# Patient Record
Sex: FEMALE | Race: WHITE | NOT HISPANIC OR LATINO | Employment: UNEMPLOYED | URBAN - METROPOLITAN AREA
[De-identification: names, ages, dates, MRNs, and addresses within clinical notes are randomized per-mention and may not be internally consistent; named-entity substitution may affect disease eponyms.]

---

## 2017-09-27 ENCOUNTER — HOSPITAL ENCOUNTER (EMERGENCY)
Facility: HOSPITAL | Age: 37
Discharge: HOME | End: 2017-09-27
Attending: EMERGENCY MEDICINE
Payer: MEDICAID

## 2017-09-27 VITALS
BODY MASS INDEX: 32.39 KG/M2 | OXYGEN SATURATION: 100 % | TEMPERATURE: 98 F | SYSTOLIC BLOOD PRESSURE: 123 MMHG | DIASTOLIC BLOOD PRESSURE: 77 MMHG | RESPIRATION RATE: 20 BRPM | HEART RATE: 84 BPM | HEIGHT: 60 IN | WEIGHT: 165 LBS

## 2017-09-27 DIAGNOSIS — N20.1 LEFT URETERAL STONE: Primary | ICD-10-CM

## 2017-09-27 LAB
B-HCG UR QL: NEGATIVE
BACTERIA #/AREA URNS AUTO: ABNORMAL /HPF
BILIRUB UR QL STRIP: NEGATIVE
CLARITY UR REFRACT.AUTO: ABNORMAL
COLOR UR AUTO: ABNORMAL
GLUCOSE UR QL STRIP: ABNORMAL
HGB UR QL STRIP: ABNORMAL
HYALINE CASTS UR QL AUTO: 0 /LPF
KETONES UR QL STRIP: ABNORMAL
LEUKOCYTE ESTERASE UR QL STRIP: ABNORMAL
MICROSCOPIC COMMENT: ABNORMAL
NITRITE UR QL STRIP: NEGATIVE
PH UR STRIP: 5 [PH] (ref 5–8)
PROT UR QL STRIP: ABNORMAL
RBC #/AREA URNS AUTO: 30 /HPF (ref 0–4)
SP GR UR STRIP: 1.03 (ref 1–1.03)
URN SPEC COLLECT METH UR: ABNORMAL
UROBILINOGEN UR STRIP-ACNC: 1 EU/DL
WBC #/AREA URNS AUTO: 5 /HPF (ref 0–5)

## 2017-09-27 PROCEDURE — 99284 EMERGENCY DEPT VISIT MOD MDM: CPT | Mod: 25

## 2017-09-27 PROCEDURE — 87086 URINE CULTURE/COLONY COUNT: CPT

## 2017-09-27 PROCEDURE — 81025 URINE PREGNANCY TEST: CPT

## 2017-09-27 PROCEDURE — 96372 THER/PROPH/DIAG INJ SC/IM: CPT

## 2017-09-27 PROCEDURE — 63600175 PHARM REV CODE 636 W HCPCS: Performed by: EMERGENCY MEDICINE

## 2017-09-27 PROCEDURE — 81000 URINALYSIS NONAUTO W/SCOPE: CPT

## 2017-09-27 RX ORDER — KETOROLAC TROMETHAMINE 30 MG/ML
60 INJECTION, SOLUTION INTRAMUSCULAR; INTRAVENOUS
Status: COMPLETED | OUTPATIENT
Start: 2017-09-27 | End: 2017-09-27

## 2017-09-27 RX ORDER — HYDROCODONE BITARTRATE AND ACETAMINOPHEN 7.5; 325 MG/1; MG/1
1 TABLET ORAL EVERY 6 HOURS PRN
Qty: 20 TABLET | Refills: 0 | Status: SHIPPED | OUTPATIENT
Start: 2017-09-27 | End: 2020-05-26

## 2017-09-27 RX ORDER — TAMSULOSIN HYDROCHLORIDE 0.4 MG/1
0.4 CAPSULE ORAL DAILY
Qty: 5 CAPSULE | Refills: 1 | Status: ON HOLD | OUTPATIENT
Start: 2017-09-27 | End: 2020-06-05 | Stop reason: HOSPADM

## 2017-09-27 RX ADMIN — KETOROLAC TROMETHAMINE 60 MG: 60 INJECTION, SOLUTION INTRAMUSCULAR at 01:09

## 2017-09-27 NOTE — ED PROVIDER NOTES
"Encounter Date: 2017       History     Chief Complaint   Patient presents with    Abdominal Pain     pt states "it feels like my crotch is about to fall out" started about 2 hours ago. denies any urinary s/s. left sided abdominal pain that radiates down to pelvis. no n/v/d. last bm was earlier tonight.      The history is provided by the patient.   Abdominal Cramping   The primary symptoms of the illness include abdominal pain. The current episode started 3 to 5 hours ago. The onset of the illness was sudden.   The abdominal pain began 3 to 5 hours ago. The pain came on suddenly. The abdominal pain has been unchanged since its onset. The abdominal pain is located in the left flank and suprapubic region. The abdominal pain does not radiate. The severity of the abdominal pain is 3/10. The abdominal pain is relieved by nothing.   The patient states that she believes she is currently not pregnant. The patient has not had a change in bowel habit. Additional symptoms associated with the illness include chills. Symptoms associated with the illness do not include anorexia or diaphoresis. Significant associated medical issues do not include sickle cell disease or HIV.     Review of patient's allergies indicates:   Allergen Reactions    Pcn [penicillins] Hives    Sulfa (sulfonamide antibiotics) Other (See Comments)     Pt reports she felt really weak     Past Medical History:   Diagnosis Date    Anxiety     Cervical vertebral fusion     Depression      Past Surgical History:   Procedure Laterality Date    CARPAL TUNNEL RELEASE      left and right wrists    CERVICAL FUSION  2016     SECTION, CLASSIC      INTRAUTERINE DEVICE INSERTION       History reviewed. No pertinent family history.  Social History   Substance Use Topics    Smoking status: Current Every Day Smoker     Packs/day: 0.50     Types: Cigarettes    Smokeless tobacco: Never Used    Alcohol use No     Review of Systems "   Constitutional: Positive for chills. Negative for diaphoresis.   Gastrointestinal: Positive for abdominal pain. Negative for anorexia.       Physical Exam     Initial Vitals [09/27/17 0133]   BP Pulse Resp Temp SpO2   132/71 82 17 98.3 °F (36.8 °C) 99 %      MAP       91.33         Physical Exam    Nursing note and vitals reviewed.  Constitutional: She appears well-developed and well-nourished.   HENT:   Head: Normocephalic and atraumatic.   Eyes: EOM are normal.   Neck: Normal range of motion. Neck supple.   Pulmonary/Chest: Breath sounds normal.   Abdominal: Soft.   Musculoskeletal: Normal range of motion.   Neurological: She is alert and oriented to person, place, and time.   Skin: Skin is warm and dry. Capillary refill takes less than 2 seconds.   Psychiatric: She has a normal mood and affect. Her behavior is normal. Judgment and thought content normal.         ED Course   Procedures  Labs Reviewed   URINALYSIS - Abnormal; Notable for the following:        Result Value    Appearance, UA Hazy (*)     Protein, UA 2+ (*)     Glucose, UA Trace (*)     Ketones, UA 1+ (*)     Occult Blood UA 3+ (*)     Leukocytes, UA 2+ (*)     All other components within normal limits   URINALYSIS MICROSCOPIC - Abnormal; Notable for the following:     RBC, UA 30 (*)     Bacteria, UA Few (*)     All other components within normal limits   CULTURE, URINE   PREGNANCY TEST, URINE RAPID                               ED Course      Clinical Impression:   The encounter diagnosis was Left ureteral stone.    Disposition:   Disposition: Discharged  Condition: Stable                        Cindy Chandra MD  09/27/17 0325

## 2017-09-28 LAB — BACTERIA UR CULT: NO GROWTH

## 2020-05-26 ENCOUNTER — HOSPITAL ENCOUNTER (EMERGENCY)
Facility: HOSPITAL | Age: 40
Discharge: HOME | End: 2020-05-26
Attending: EMERGENCY MEDICINE
Payer: MEDICAID

## 2020-05-26 VITALS
BODY MASS INDEX: 27.48 KG/M2 | TEMPERATURE: 98 F | OXYGEN SATURATION: 100 % | HEIGHT: 60 IN | DIASTOLIC BLOOD PRESSURE: 85 MMHG | WEIGHT: 140 LBS | RESPIRATION RATE: 20 BRPM | SYSTOLIC BLOOD PRESSURE: 127 MMHG | HEART RATE: 101 BPM

## 2020-05-26 DIAGNOSIS — Z04.71 ENCOUNTER FOR EXAMINATION AND OBSERVATION FOLLOWING ALLEGED ADULT PHYSICAL ABUSE: ICD-10-CM

## 2020-05-26 DIAGNOSIS — S01.81XA FACIAL LACERATION, INITIAL ENCOUNTER: Primary | ICD-10-CM

## 2020-05-26 PROCEDURE — 25000003 PHARM REV CODE 250: Mod: ER | Performed by: EMERGENCY MEDICINE

## 2020-05-26 PROCEDURE — 99283 EMERGENCY DEPT VISIT LOW MDM: CPT | Mod: ER

## 2020-05-26 RX ORDER — TRAMADOL HYDROCHLORIDE AND ACETAMINOPHEN 37.5; 325 MG/1; MG/1
1 TABLET, FILM COATED ORAL EVERY 6 HOURS PRN
Qty: 9 TABLET | Refills: 0 | Status: ON HOLD | OUTPATIENT
Start: 2020-05-26 | End: 2020-06-05 | Stop reason: HOSPADM

## 2020-05-26 RX ORDER — OXYCODONE AND ACETAMINOPHEN 5; 325 MG/1; MG/1
1 TABLET ORAL
Status: COMPLETED | OUTPATIENT
Start: 2020-05-26 | End: 2020-05-26

## 2020-05-26 RX ADMIN — OXYCODONE AND ACETAMINOPHEN 1 TABLET: 5; 325 TABLET ORAL at 05:05

## 2020-05-26 NOTE — DISCHARGE INSTRUCTIONS
Please return to the ED immediately if symptoms return, change or worsen in any way.  Please call your primary doctor today for reevaluation appointment.

## 2020-05-26 NOTE — ED NOTES
Pt was unable to get in touch with her sister but was able to get in touch with her Aunt, who lives in [facility]. Her Aunt is on the way to  patient.
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
## 2020-05-29 PROBLEM — E87.6 HYPOKALEMIA: Status: ACTIVE | Noted: 2020-05-29

## 2020-05-29 PROBLEM — R45.89 SUICIDAL BEHAVIOR: Status: ACTIVE | Noted: 2020-05-29

## 2020-05-29 PROBLEM — R10.9 ABDOMINAL PAIN: Status: ACTIVE | Noted: 2020-05-29

## 2020-05-29 PROBLEM — R07.89 CHEST DISCOMFORT: Status: ACTIVE | Noted: 2020-05-29

## 2020-05-30 ENCOUNTER — ANESTHESIA EVENT (OUTPATIENT)
Dept: SURGERY | Facility: HOSPITAL | Age: 40
DRG: 327 | End: 2020-05-30
Payer: MEDICAID

## 2020-05-30 ENCOUNTER — HOSPITAL ENCOUNTER (INPATIENT)
Facility: HOSPITAL | Age: 40
LOS: 6 days | Discharge: PSYCHIATRIC OR SUBSTANCE USE FACILITY | DRG: 327 | End: 2020-06-05
Attending: SURGERY | Admitting: SURGERY
Payer: MEDICAID

## 2020-05-30 ENCOUNTER — ANESTHESIA (OUTPATIENT)
Dept: SURGERY | Facility: HOSPITAL | Age: 40
DRG: 327 | End: 2020-05-30
Payer: MEDICAID

## 2020-05-30 DIAGNOSIS — R45.89 SUICIDAL BEHAVIOR WITHOUT ATTEMPTED SELF-INJURY: Primary | ICD-10-CM

## 2020-05-30 DIAGNOSIS — R16.1 SPLENIC MASS: ICD-10-CM

## 2020-05-30 DIAGNOSIS — R10.10 PAIN OF UPPER ABDOMEN: ICD-10-CM

## 2020-05-30 DIAGNOSIS — K25.1 ACUTE GASTRIC ULCER WITH PERFORATION: ICD-10-CM

## 2020-05-30 DIAGNOSIS — K66.8 INTRA-ABDOMINAL FREE AIR OF UNKNOWN ETIOLOGY: ICD-10-CM

## 2020-05-30 PROBLEM — K25.5 GASTRIC PERFORATION: Status: ACTIVE | Noted: 2020-05-30

## 2020-05-30 LAB
ABO + RH BLD: NORMAL
BLD GP AB SCN CELLS X3 SERPL QL: NORMAL
SARS-COV-2 RDRP RESP QL NAA+PROBE: NEGATIVE

## 2020-05-30 PROCEDURE — 25000003 PHARM REV CODE 250: Performed by: STUDENT IN AN ORGANIZED HEALTH CARE EDUCATION/TRAINING PROGRAM

## 2020-05-30 PROCEDURE — 88305 TISSUE EXAM BY PATHOLOGIST: CPT | Mod: 26,,, | Performed by: PATHOLOGY

## 2020-05-30 PROCEDURE — 87075 CULTR BACTERIA EXCEPT BLOOD: CPT

## 2020-05-30 PROCEDURE — 87102 FUNGUS ISOLATION CULTURE: CPT

## 2020-05-30 PROCEDURE — 87015 SPECIMEN INFECT AGNT CONCNTJ: CPT

## 2020-05-30 PROCEDURE — 88305 TISSUE EXAM BY PATHOLOGIST: ICD-10-PCS | Mod: 26,,, | Performed by: PATHOLOGY

## 2020-05-30 PROCEDURE — 87106 FUNGI IDENTIFICATION YEAST: CPT

## 2020-05-30 PROCEDURE — 37000009 HC ANESTHESIA EA ADD 15 MINS: Performed by: SURGERY

## 2020-05-30 PROCEDURE — U0002 COVID-19 LAB TEST NON-CDC: HCPCS

## 2020-05-30 PROCEDURE — 88305 TISSUE EXAM BY PATHOLOGIST: CPT | Mod: 59 | Performed by: PATHOLOGY

## 2020-05-30 PROCEDURE — C9290 INJ, BUPIVACAINE LIPOSOME: HCPCS | Performed by: SURGERY

## 2020-05-30 PROCEDURE — 37000008 HC ANESTHESIA 1ST 15 MINUTES: Performed by: SURGERY

## 2020-05-30 PROCEDURE — 25000003 PHARM REV CODE 250: Performed by: SURGERY

## 2020-05-30 PROCEDURE — 86901 BLOOD TYPING SEROLOGIC RH(D): CPT

## 2020-05-30 PROCEDURE — 87116 MYCOBACTERIA CULTURE: CPT

## 2020-05-30 PROCEDURE — 20000000 HC ICU ROOM

## 2020-05-30 PROCEDURE — 36000709 HC OR TIME LEV III EA ADD 15 MIN: Performed by: SURGERY

## 2020-05-30 PROCEDURE — 27201423 OPTIME MED/SURG SUP & DEVICES STERILE SUPPLY: Performed by: SURGERY

## 2020-05-30 PROCEDURE — 63600175 PHARM REV CODE 636 W HCPCS: Performed by: SURGERY

## 2020-05-30 PROCEDURE — 87205 SMEAR GRAM STAIN: CPT

## 2020-05-30 PROCEDURE — 87206 SMEAR FLUORESCENT/ACID STAI: CPT

## 2020-05-30 PROCEDURE — 87076 CULTURE ANAEROBE IDENT EACH: CPT

## 2020-05-30 PROCEDURE — 36000708 HC OR TIME LEV III 1ST 15 MIN: Performed by: SURGERY

## 2020-05-30 PROCEDURE — 87070 CULTURE OTHR SPECIMN AEROBIC: CPT

## 2020-05-30 PROCEDURE — 63600175 PHARM REV CODE 636 W HCPCS: Performed by: STUDENT IN AN ORGANIZED HEALTH CARE EDUCATION/TRAINING PROGRAM

## 2020-05-30 RX ORDER — FLUCONAZOLE 2 MG/ML
200 INJECTION, SOLUTION INTRAVENOUS
Status: DISCONTINUED | OUTPATIENT
Start: 2020-05-31 | End: 2020-06-04

## 2020-05-30 RX ORDER — SODIUM CHLORIDE 9 MG/ML
INJECTION, SOLUTION INTRAVENOUS CONTINUOUS PRN
Status: DISCONTINUED | OUTPATIENT
Start: 2020-05-30 | End: 2020-05-30

## 2020-05-30 RX ORDER — OXYCODONE HYDROCHLORIDE 5 MG/1
5 TABLET ORAL
Status: CANCELLED | OUTPATIENT
Start: 2020-05-30

## 2020-05-30 RX ORDER — HYDROMORPHONE HYDROCHLORIDE 2 MG/ML
0.5 INJECTION, SOLUTION INTRAMUSCULAR; INTRAVENOUS; SUBCUTANEOUS
Status: CANCELLED | OUTPATIENT
Start: 2020-05-30

## 2020-05-30 RX ORDER — LIDOCAINE HYDROCHLORIDE 20 MG/ML
INJECTION, SOLUTION EPIDURAL; INFILTRATION; INTRACAUDAL; PERINEURAL
Status: DISCONTINUED | OUTPATIENT
Start: 2020-05-30 | End: 2020-05-30

## 2020-05-30 RX ORDER — KETOROLAC TROMETHAMINE 30 MG/ML
10 INJECTION, SOLUTION INTRAMUSCULAR; INTRAVENOUS EVERY 6 HOURS
Status: COMPLETED | OUTPATIENT
Start: 2020-05-31 | End: 2020-06-01

## 2020-05-30 RX ORDER — PREGABALIN 75 MG/1
75 CAPSULE ORAL 2 TIMES DAILY
Status: DISCONTINUED | OUTPATIENT
Start: 2020-05-30 | End: 2020-06-05 | Stop reason: HOSPADM

## 2020-05-30 RX ORDER — DIPHENHYDRAMINE HYDROCHLORIDE 50 MG/ML
12.5 INJECTION INTRAMUSCULAR; INTRAVENOUS EVERY 4 HOURS PRN
Status: DISCONTINUED | OUTPATIENT
Start: 2020-05-30 | End: 2020-06-05 | Stop reason: HOSPADM

## 2020-05-30 RX ORDER — ALBUTEROL SULFATE 2.5 MG/.5ML
2.5 SOLUTION RESPIRATORY (INHALATION) EVERY 4 HOURS PRN
Status: DISCONTINUED | OUTPATIENT
Start: 2020-05-30 | End: 2020-06-05 | Stop reason: HOSPADM

## 2020-05-30 RX ORDER — FENTANYL CITRATE 50 UG/ML
INJECTION, SOLUTION INTRAMUSCULAR; INTRAVENOUS
Status: DISCONTINUED | OUTPATIENT
Start: 2020-05-30 | End: 2020-05-30

## 2020-05-30 RX ORDER — ROCURONIUM BROMIDE 10 MG/ML
INJECTION, SOLUTION INTRAVENOUS
Status: DISCONTINUED | OUTPATIENT
Start: 2020-05-30 | End: 2020-05-30

## 2020-05-30 RX ORDER — SUCCINYLCHOLINE CHLORIDE 20 MG/ML
INJECTION INTRAMUSCULAR; INTRAVENOUS
Status: DISCONTINUED | OUTPATIENT
Start: 2020-05-30 | End: 2020-05-30

## 2020-05-30 RX ORDER — PROPOFOL 10 MG/ML
VIAL (ML) INTRAVENOUS
Status: DISCONTINUED | OUTPATIENT
Start: 2020-05-30 | End: 2020-05-30

## 2020-05-30 RX ORDER — ACETAMINOPHEN 10 MG/ML
INJECTION, SOLUTION INTRAVENOUS
Status: DISCONTINUED | OUTPATIENT
Start: 2020-05-30 | End: 2020-05-30

## 2020-05-30 RX ORDER — NALOXONE HCL 0.4 MG/ML
0.02 VIAL (ML) INJECTION
Status: DISCONTINUED | OUTPATIENT
Start: 2020-05-30 | End: 2020-06-05 | Stop reason: HOSPADM

## 2020-05-30 RX ORDER — HYDROMORPHONE HYDROCHLORIDE 1 MG/ML
0.5 INJECTION, SOLUTION INTRAMUSCULAR; INTRAVENOUS; SUBCUTANEOUS ONCE
Status: COMPLETED | OUTPATIENT
Start: 2020-05-31 | End: 2020-05-30

## 2020-05-30 RX ORDER — HYDROMORPHONE HCL IN 0.9% NACL 6 MG/30 ML
PATIENT CONTROLLED ANALGESIA SYRINGE INTRAVENOUS CONTINUOUS
Status: DISCONTINUED | OUTPATIENT
Start: 2020-05-31 | End: 2020-06-02

## 2020-05-30 RX ORDER — ESCITALOPRAM OXALATE 10 MG/1
10 TABLET ORAL DAILY
Status: DISCONTINUED | OUTPATIENT
Start: 2020-05-31 | End: 2020-06-05 | Stop reason: HOSPADM

## 2020-05-30 RX ORDER — DEXAMETHASONE SODIUM PHOSPHATE 4 MG/ML
INJECTION, SOLUTION INTRA-ARTICULAR; INTRALESIONAL; INTRAMUSCULAR; INTRAVENOUS; SOFT TISSUE
Status: DISCONTINUED | OUTPATIENT
Start: 2020-05-30 | End: 2020-05-30

## 2020-05-30 RX ORDER — DEXTROSE MONOHYDRATE, SODIUM CHLORIDE, AND POTASSIUM CHLORIDE 50; 1.49; 4.5 G/1000ML; G/1000ML; G/1000ML
INJECTION, SOLUTION INTRAVENOUS CONTINUOUS
Status: DISCONTINUED | OUTPATIENT
Start: 2020-05-31 | End: 2020-06-04

## 2020-05-30 RX ORDER — SODIUM CHLORIDE 0.9 % (FLUSH) 0.9 %
10 SYRINGE (ML) INJECTION
Status: CANCELLED | OUTPATIENT
Start: 2020-05-30

## 2020-05-30 RX ORDER — LABETALOL HYDROCHLORIDE 5 MG/ML
5 INJECTION, SOLUTION INTRAVENOUS
Status: DISCONTINUED | OUTPATIENT
Start: 2020-05-30 | End: 2020-06-05 | Stop reason: HOSPADM

## 2020-05-30 RX ORDER — BACITRACIN 50000 [IU]/1
INJECTION, POWDER, FOR SOLUTION INTRAMUSCULAR
Status: DISCONTINUED | OUTPATIENT
Start: 2020-05-30 | End: 2020-05-30 | Stop reason: HOSPADM

## 2020-05-30 RX ORDER — HYDRALAZINE HYDROCHLORIDE 20 MG/ML
5 INJECTION INTRAMUSCULAR; INTRAVENOUS
Status: DISCONTINUED | OUTPATIENT
Start: 2020-05-30 | End: 2020-06-05 | Stop reason: HOSPADM

## 2020-05-30 RX ORDER — PHENYLEPHRINE HYDROCHLORIDE 10 MG/ML
INJECTION INTRAVENOUS
Status: DISCONTINUED | OUTPATIENT
Start: 2020-05-30 | End: 2020-05-30

## 2020-05-30 RX ORDER — CIPROFLOXACIN 2 MG/ML
400 INJECTION, SOLUTION INTRAVENOUS
Status: DISCONTINUED | OUTPATIENT
Start: 2020-05-31 | End: 2020-06-04

## 2020-05-30 RX ORDER — ONDANSETRON 2 MG/ML
4 INJECTION INTRAMUSCULAR; INTRAVENOUS EVERY 6 HOURS PRN
Status: DISCONTINUED | OUTPATIENT
Start: 2020-05-30 | End: 2020-06-05 | Stop reason: HOSPADM

## 2020-05-30 RX ORDER — ONDANSETRON 2 MG/ML
INJECTION INTRAMUSCULAR; INTRAVENOUS
Status: DISCONTINUED | OUTPATIENT
Start: 2020-05-30 | End: 2020-05-30

## 2020-05-30 RX ORDER — BUPIVACAINE HYDROCHLORIDE 2.5 MG/ML
INJECTION, SOLUTION EPIDURAL; INFILTRATION; INTRACAUDAL
Status: DISCONTINUED | OUTPATIENT
Start: 2020-05-30 | End: 2020-05-30 | Stop reason: HOSPADM

## 2020-05-30 RX ORDER — ONDANSETRON 2 MG/ML
4 INJECTION INTRAMUSCULAR; INTRAVENOUS DAILY PRN
Status: CANCELLED | OUTPATIENT
Start: 2020-05-30

## 2020-05-30 RX ADMIN — ROCURONIUM BROMIDE 20 MG: 10 INJECTION, SOLUTION INTRAVENOUS at 09:05

## 2020-05-30 RX ADMIN — FENTANYL CITRATE 25 MCG: 50 INJECTION, SOLUTION INTRAMUSCULAR; INTRAVENOUS at 10:05

## 2020-05-30 RX ADMIN — ROCURONIUM BROMIDE 10 MG: 10 INJECTION, SOLUTION INTRAVENOUS at 08:05

## 2020-05-30 RX ADMIN — LIDOCAINE HYDROCHLORIDE 60 MG: 20 INJECTION, SOLUTION EPIDURAL; INFILTRATION; INTRACAUDAL; PERINEURAL at 07:05

## 2020-05-30 RX ADMIN — PHENYLEPHRINE HYDROCHLORIDE 200 MCG: 10 INJECTION INTRAVENOUS at 07:05

## 2020-05-30 RX ADMIN — SODIUM CHLORIDE: 0.9 INJECTION, SOLUTION INTRAVENOUS at 07:05

## 2020-05-30 RX ADMIN — SODIUM CHLORIDE: 0.9 INJECTION, SOLUTION INTRAVENOUS at 08:05

## 2020-05-30 RX ADMIN — ACETAMINOPHEN 1000 MG: 10 INJECTION, SOLUTION INTRAVENOUS at 08:05

## 2020-05-30 RX ADMIN — SUGAMMADEX 200 MG: 100 INJECTION, SOLUTION INTRAVENOUS at 10:05

## 2020-05-30 RX ADMIN — PHENYLEPHRINE HYDROCHLORIDE 100 MCG: 10 INJECTION INTRAVENOUS at 09:05

## 2020-05-30 RX ADMIN — FENTANYL CITRATE 100 MCG: 50 INJECTION, SOLUTION INTRAMUSCULAR; INTRAVENOUS at 07:05

## 2020-05-30 RX ADMIN — PHENYLEPHRINE HYDROCHLORIDE 20 MCG/MIN: 10 INJECTION INTRAVENOUS at 09:05

## 2020-05-30 RX ADMIN — PHENYLEPHRINE HYDROCHLORIDE 200 MCG: 10 INJECTION INTRAVENOUS at 09:05

## 2020-05-30 RX ADMIN — ONDANSETRON 4 MG: 2 INJECTION, SOLUTION INTRAMUSCULAR; INTRAVENOUS at 07:05

## 2020-05-30 RX ADMIN — Medication: at 11:05

## 2020-05-30 RX ADMIN — ROCURONIUM BROMIDE 20 MG: 10 INJECTION, SOLUTION INTRAVENOUS at 07:05

## 2020-05-30 RX ADMIN — ROCURONIUM BROMIDE 20 MG: 10 INJECTION, SOLUTION INTRAVENOUS at 08:05

## 2020-05-30 RX ADMIN — SUCCINYLCHOLINE CHLORIDE 100 MG: 20 INJECTION, SOLUTION INTRAMUSCULAR; INTRAVENOUS at 07:05

## 2020-05-30 RX ADMIN — DEXAMETHASONE SODIUM PHOSPHATE 4 MG: 4 INJECTION, SOLUTION INTRA-ARTICULAR; INTRALESIONAL; INTRAMUSCULAR; INTRAVENOUS; SOFT TISSUE at 07:05

## 2020-05-30 RX ADMIN — HYDROMORPHONE HYDROCHLORIDE 0.5 MG: 1 INJECTION, SOLUTION INTRAMUSCULAR; INTRAVENOUS; SUBCUTANEOUS at 11:05

## 2020-05-30 RX ADMIN — PROPOFOL 120 MG: 10 INJECTION, EMULSION INTRAVENOUS at 07:05

## 2020-05-30 RX ADMIN — KETOROLAC TROMETHAMINE 10 MG: 30 INJECTION, SOLUTION INTRAMUSCULAR at 11:05
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
## 2020-05-30 NOTE — ANESTHESIA PREPROCEDURE EVALUATION
2020  Zandra Eckert is a 39 y.o., female with gastric perforation, recent admission for suicidal ideation, here for diagnostic lap under GETA    Past Medical History:   Diagnosis Date    Anxiety     Cervical vertebral fusion     Depression      Past Surgical History:   Procedure Laterality Date    CARPAL TUNNEL RELEASE      left and right wrists    CERVICAL FUSION  2016     SECTION, CLASSIC      INTRAUTERINE DEVICE INSERTION       Lab Results   Component Value Date    WBC 17.60 (H) 2020    HGB 15.3 2020    HCT 45.0 2020     (H) 2020    CHOL 148 2020    TRIG 84 2020    HDL 39 (L) 2020    ALT 13 2020    AST 18 2020     (L) 2020    K 4.1 2020    CL 99 2020    CREATININE 0.82 2020    BUN 17 2020    CO2 24 2020    TSH 1.00 2010    HGBA1C 5.2 2020          Anesthesia Evaluation    I have reviewed the Patient Summary Reports.    I have reviewed the Nursing Notes.    I have reviewed the Medications.     Review of Systems  Anesthesia Hx:  No problems with previous Anesthesia Denies Hx of Anesthetic complications  Denies Family Hx of Anesthesia complications.   Denies Personal Hx of Anesthesia complications.   Social:  Non-Smoker, No Alcohol Use    Hematology/Oncology:     Oncology Normal     EENT/Dental:EENT/Dental Normal   Cardiovascular:  Cardiovascular Normal Exercise tolerance: good  ECG has been reviewed.    Pulmonary:  Pulmonary Normal    Renal/:  Renal/ Normal     Hepatic/GI:   Gastric perf   Neurological:  Neurology Normal    Endocrine:  Endocrine Normal    Psych:   Psychiatric History          Physical Exam  General:  Well nourished    Airway/Jaw/Neck:  Airway Findings: Mouth Opening: Normal Tongue: Normal  Mallampati: II     Eyes/Ears/Nose:  Eyes/Ears/Nose  Findings:    Dental:  Dental Findings: Extremely poor dentition. Many chipped missing teeth   Chest/Lungs:  Chest/Lungs Findings: Clear to auscultation     Heart/Vascular:  Heart Findings: Rate: Normal        Mental Status:  Mental Status Findings:  Alert and Oriented         Anesthesia Plan  Type of Anesthesia, risks & benefits discussed:  Anesthesia Type:  general  Patient's Preference:   Intra-op Monitoring Plan: standard ASA monitors  Intra-op Monitoring Plan Comments:   Post Op Pain Control Plan:   Post Op Pain Control Plan Comments:   Induction:   IV  Beta Blocker:  Patient is not currently on a Beta-Blocker (No further documentation required).       Informed Consent: Patient understands risks and agrees with Anesthesia plan.  Questions answered. Anesthesia consent signed with patient.  ASA Score: 2  emergent   Day of Surgery Review of History & Physical:            Ready For Surgery From Anesthesia Perspective.

## 2020-05-31 LAB
ALBUMIN SERPL BCP-MCNC: 2 G/DL (ref 3.5–5.2)
ALBUMIN SERPL BCP-MCNC: 2.1 G/DL (ref 3.5–5.2)
ALP SERPL-CCNC: 69 U/L (ref 55–135)
ALP SERPL-CCNC: 70 U/L (ref 55–135)
ALT SERPL W/O P-5'-P-CCNC: 64 U/L (ref 10–44)
ALT SERPL W/O P-5'-P-CCNC: 66 U/L (ref 10–44)
ANION GAP SERPL CALC-SCNC: 11 MMOL/L (ref 8–16)
ANION GAP SERPL CALC-SCNC: 9 MMOL/L (ref 8–16)
ANISOCYTOSIS BLD QL SMEAR: SLIGHT
ANISOCYTOSIS BLD QL SMEAR: SLIGHT
AST SERPL-CCNC: 80 U/L (ref 10–40)
AST SERPL-CCNC: 92 U/L (ref 10–40)
BASOPHILS # BLD AUTO: ABNORMAL K/UL (ref 0–0.2)
BASOPHILS # BLD AUTO: ABNORMAL K/UL (ref 0–0.2)
BASOPHILS NFR BLD: 0 % (ref 0–1.9)
BASOPHILS NFR BLD: 0 % (ref 0–1.9)
BILIRUB SERPL-MCNC: 0.6 MG/DL (ref 0.1–1)
BILIRUB SERPL-MCNC: 0.6 MG/DL (ref 0.1–1)
BUN SERPL-MCNC: 18 MG/DL (ref 6–20)
BUN SERPL-MCNC: 19 MG/DL (ref 6–20)
BURR CELLS BLD QL SMEAR: ABNORMAL
BURR CELLS BLD QL SMEAR: ABNORMAL
CA-I BLDV-SCNC: 0.99 MMOL/L (ref 1.06–1.42)
CALCIUM SERPL-MCNC: 7.3 MG/DL (ref 8.7–10.5)
CALCIUM SERPL-MCNC: 7.6 MG/DL (ref 8.7–10.5)
CHLORIDE SERPL-SCNC: 104 MMOL/L (ref 95–110)
CHLORIDE SERPL-SCNC: 104 MMOL/L (ref 95–110)
CO2 SERPL-SCNC: 16 MMOL/L (ref 23–29)
CO2 SERPL-SCNC: 18 MMOL/L (ref 23–29)
CREAT SERPL-MCNC: 0.8 MG/DL (ref 0.5–1.4)
CREAT SERPL-MCNC: 0.8 MG/DL (ref 0.5–1.4)
DIFFERENTIAL METHOD: ABNORMAL
DIFFERENTIAL METHOD: ABNORMAL
EOSINOPHIL # BLD AUTO: ABNORMAL K/UL (ref 0–0.5)
EOSINOPHIL # BLD AUTO: ABNORMAL K/UL (ref 0–0.5)
EOSINOPHIL NFR BLD: 0 % (ref 0–8)
EOSINOPHIL NFR BLD: 0 % (ref 0–8)
ERYTHROCYTE [DISTWIDTH] IN BLOOD BY AUTOMATED COUNT: 12.9 % (ref 11.5–14.5)
ERYTHROCYTE [DISTWIDTH] IN BLOOD BY AUTOMATED COUNT: 13 % (ref 11.5–14.5)
EST. GFR  (AFRICAN AMERICAN): >60 ML/MIN/1.73 M^2
EST. GFR  (AFRICAN AMERICAN): >60 ML/MIN/1.73 M^2
EST. GFR  (NON AFRICAN AMERICAN): >60 ML/MIN/1.73 M^2
EST. GFR  (NON AFRICAN AMERICAN): >60 ML/MIN/1.73 M^2
GLUCOSE SERPL-MCNC: 233 MG/DL (ref 70–110)
GLUCOSE SERPL-MCNC: 235 MG/DL (ref 70–110)
GRAM STN SPEC: NORMAL
GRAM STN SPEC: NORMAL
HCT VFR BLD AUTO: 37.4 % (ref 37–48.5)
HCT VFR BLD AUTO: 41.2 % (ref 37–48.5)
HGB BLD-MCNC: 12.4 G/DL (ref 12–16)
HGB BLD-MCNC: 13.8 G/DL (ref 12–16)
IMM GRANULOCYTES # BLD AUTO: ABNORMAL K/UL (ref 0–0.04)
IMM GRANULOCYTES # BLD AUTO: ABNORMAL K/UL (ref 0–0.04)
IMM GRANULOCYTES NFR BLD AUTO: ABNORMAL % (ref 0–0.5)
IMM GRANULOCYTES NFR BLD AUTO: ABNORMAL % (ref 0–0.5)
LYMPHOCYTES # BLD AUTO: ABNORMAL K/UL (ref 1–4.8)
LYMPHOCYTES # BLD AUTO: ABNORMAL K/UL (ref 1–4.8)
LYMPHOCYTES NFR BLD: 2 % (ref 18–48)
LYMPHOCYTES NFR BLD: 2 % (ref 18–48)
MAGNESIUM SERPL-MCNC: 1.6 MG/DL (ref 1.6–2.6)
MAGNESIUM SERPL-MCNC: 1.6 MG/DL (ref 1.6–2.6)
MCH RBC QN AUTO: 30.6 PG (ref 27–31)
MCH RBC QN AUTO: 30.9 PG (ref 27–31)
MCHC RBC AUTO-ENTMCNC: 33.2 G/DL (ref 32–36)
MCHC RBC AUTO-ENTMCNC: 33.5 G/DL (ref 32–36)
MCV RBC AUTO: 92 FL (ref 82–98)
MCV RBC AUTO: 92 FL (ref 82–98)
MONOCYTES # BLD AUTO: ABNORMAL K/UL (ref 0.3–1)
MONOCYTES # BLD AUTO: ABNORMAL K/UL (ref 0.3–1)
MONOCYTES NFR BLD: 1 % (ref 4–15)
MONOCYTES NFR BLD: 2 % (ref 4–15)
NEUTROPHILS NFR BLD: 78 % (ref 38–73)
NEUTROPHILS NFR BLD: 86 % (ref 38–73)
NEUTS BAND NFR BLD MANUAL: 10 %
NEUTS BAND NFR BLD MANUAL: 19 %
NRBC BLD-RTO: 0 /100 WBC
NRBC BLD-RTO: 0 /100 WBC
OVALOCYTES BLD QL SMEAR: ABNORMAL
OVALOCYTES BLD QL SMEAR: ABNORMAL
PHOSPHATE SERPL-MCNC: 3.2 MG/DL (ref 2.7–4.5)
PHOSPHATE SERPL-MCNC: 3.4 MG/DL (ref 2.7–4.5)
PLATELET # BLD AUTO: 422 K/UL (ref 150–350)
PLATELET # BLD AUTO: 434 K/UL (ref 150–350)
PLATELET BLD QL SMEAR: ABNORMAL
PLATELET BLD QL SMEAR: ABNORMAL
PMV BLD AUTO: 9.5 FL (ref 9.2–12.9)
PMV BLD AUTO: 9.6 FL (ref 9.2–12.9)
POCT GLUCOSE: 101 MG/DL (ref 70–110)
POCT GLUCOSE: 119 MG/DL (ref 70–110)
POCT GLUCOSE: 138 MG/DL (ref 70–110)
POCT GLUCOSE: 85 MG/DL (ref 70–110)
POIKILOCYTOSIS BLD QL SMEAR: SLIGHT
POIKILOCYTOSIS BLD QL SMEAR: SLIGHT
POTASSIUM SERPL-SCNC: 3.8 MMOL/L (ref 3.5–5.1)
POTASSIUM SERPL-SCNC: 4 MMOL/L (ref 3.5–5.1)
PROT SERPL-MCNC: 5.1 G/DL (ref 6–8.4)
PROT SERPL-MCNC: 5.3 G/DL (ref 6–8.4)
RBC # BLD AUTO: 4.05 M/UL (ref 4–5.4)
RBC # BLD AUTO: 4.46 M/UL (ref 4–5.4)
SODIUM SERPL-SCNC: 131 MMOL/L (ref 136–145)
SODIUM SERPL-SCNC: 131 MMOL/L (ref 136–145)
WBC # BLD AUTO: 13.02 K/UL (ref 3.9–12.7)
WBC # BLD AUTO: 13.99 K/UL (ref 3.9–12.7)

## 2020-05-31 PROCEDURE — 94799 UNLISTED PULMONARY SVC/PX: CPT

## 2020-05-31 PROCEDURE — 84100 ASSAY OF PHOSPHORUS: CPT | Mod: 91

## 2020-05-31 PROCEDURE — 80053 COMPREHEN METABOLIC PANEL: CPT

## 2020-05-31 PROCEDURE — 94770 HC EXHALED C02 TEST: CPT

## 2020-05-31 PROCEDURE — 25000003 PHARM REV CODE 250: Performed by: SURGERY

## 2020-05-31 PROCEDURE — 11000001 HC ACUTE MED/SURG PRIVATE ROOM

## 2020-05-31 PROCEDURE — 80053 COMPREHEN METABOLIC PANEL: CPT | Mod: 91

## 2020-05-31 PROCEDURE — S0030 INJECTION, METRONIDAZOLE: HCPCS | Performed by: HOSPITALIST

## 2020-05-31 PROCEDURE — 25000003 PHARM REV CODE 250: Performed by: HOSPITALIST

## 2020-05-31 PROCEDURE — 94761 N-INVAS EAR/PLS OXIMETRY MLT: CPT

## 2020-05-31 PROCEDURE — C9113 INJ PANTOPRAZOLE SODIUM, VIA: HCPCS | Performed by: HOSPITALIST

## 2020-05-31 PROCEDURE — 83735 ASSAY OF MAGNESIUM: CPT | Mod: 91

## 2020-05-31 PROCEDURE — 63600175 PHARM REV CODE 636 W HCPCS: Performed by: SURGERY

## 2020-05-31 PROCEDURE — 85027 COMPLETE CBC AUTOMATED: CPT | Mod: 91

## 2020-05-31 PROCEDURE — 99900035 HC TECH TIME PER 15 MIN (STAT)

## 2020-05-31 PROCEDURE — 85007 BL SMEAR W/DIFF WBC COUNT: CPT

## 2020-05-31 PROCEDURE — 63600175 PHARM REV CODE 636 W HCPCS: Performed by: HOSPITALIST

## 2020-05-31 PROCEDURE — 36415 COLL VENOUS BLD VENIPUNCTURE: CPT

## 2020-05-31 PROCEDURE — 84100 ASSAY OF PHOSPHORUS: CPT

## 2020-05-31 PROCEDURE — 83735 ASSAY OF MAGNESIUM: CPT

## 2020-05-31 PROCEDURE — 82330 ASSAY OF CALCIUM: CPT

## 2020-05-31 RX ORDER — PANTOPRAZOLE SODIUM 40 MG/10ML
40 INJECTION, POWDER, LYOPHILIZED, FOR SOLUTION INTRAVENOUS DAILY
Status: DISCONTINUED | OUTPATIENT
Start: 2020-05-31 | End: 2020-06-05

## 2020-05-31 RX ORDER — ENOXAPARIN SODIUM 100 MG/ML
40 INJECTION SUBCUTANEOUS EVERY 24 HOURS
Status: DISCONTINUED | OUTPATIENT
Start: 2020-05-31 | End: 2020-06-05 | Stop reason: HOSPADM

## 2020-05-31 RX ORDER — METRONIDAZOLE 500 MG/100ML
500 INJECTION, SOLUTION INTRAVENOUS
Status: DISCONTINUED | OUTPATIENT
Start: 2020-05-31 | End: 2020-06-04

## 2020-05-31 RX ORDER — INSULIN ASPART 100 [IU]/ML
0-5 INJECTION, SOLUTION INTRAVENOUS; SUBCUTANEOUS EVERY 6 HOURS PRN
Status: DISCONTINUED | OUTPATIENT
Start: 2020-05-31 | End: 2020-06-05 | Stop reason: HOSPADM

## 2020-05-31 RX ORDER — DEXTROSE 50 % IN WATER (D50W) INTRAVENOUS SYRINGE
12.5
Status: DISCONTINUED | OUTPATIENT
Start: 2020-05-31 | End: 2020-06-05 | Stop reason: HOSPADM

## 2020-05-31 RX ORDER — GLUCAGON 1 MG
1 KIT INJECTION
Status: DISCONTINUED | OUTPATIENT
Start: 2020-05-31 | End: 2020-06-05 | Stop reason: HOSPADM

## 2020-05-31 RX ADMIN — CIPROFLOXACIN 400 MG: 2 INJECTION, SOLUTION INTRAVENOUS at 12:05

## 2020-05-31 RX ADMIN — CALCIUM GLUCONATE 1000 MG: 98 INJECTION, SOLUTION INTRAVENOUS at 02:05

## 2020-05-31 RX ADMIN — DEXTROSE, SODIUM CHLORIDE, AND POTASSIUM CHLORIDE: 5; .45; .15 INJECTION INTRAVENOUS at 10:05

## 2020-05-31 RX ADMIN — PREGABALIN 75 MG: 75 CAPSULE ORAL at 08:05

## 2020-05-31 RX ADMIN — IRON SUCROSE 100 MG: 20 INJECTION, SOLUTION INTRAVENOUS at 09:05

## 2020-05-31 RX ADMIN — METRONIDAZOLE 500 MG: 500 INJECTION, SOLUTION INTRAVENOUS at 01:05

## 2020-05-31 RX ADMIN — METRONIDAZOLE 500 MG: 500 INJECTION, SOLUTION INTRAVENOUS at 09:05

## 2020-05-31 RX ADMIN — CIPROFLOXACIN 400 MG: 2 INJECTION, SOLUTION INTRAVENOUS at 11:05

## 2020-05-31 RX ADMIN — KETOROLAC TROMETHAMINE 10 MG: 30 INJECTION, SOLUTION INTRAMUSCULAR at 11:05

## 2020-05-31 RX ADMIN — KETOROLAC TROMETHAMINE 10 MG: 30 INJECTION, SOLUTION INTRAMUSCULAR at 06:05

## 2020-05-31 RX ADMIN — DEXTROSE, SODIUM CHLORIDE, AND POTASSIUM CHLORIDE: 5; .45; .15 INJECTION INTRAVENOUS at 09:05

## 2020-05-31 RX ADMIN — KETOROLAC TROMETHAMINE 10 MG: 30 INJECTION, SOLUTION INTRAMUSCULAR at 05:05

## 2020-05-31 RX ADMIN — DEXTROSE, SODIUM CHLORIDE, AND POTASSIUM CHLORIDE: 5; .45; .15 INJECTION INTRAVENOUS at 12:05

## 2020-05-31 RX ADMIN — KETOROLAC TROMETHAMINE 10 MG: 30 INJECTION, SOLUTION INTRAMUSCULAR at 12:05

## 2020-05-31 RX ADMIN — PREGABALIN 75 MG: 75 CAPSULE ORAL at 09:05

## 2020-05-31 RX ADMIN — Medication: at 10:05

## 2020-05-31 RX ADMIN — FLUCONAZOLE 200 MG: 2 INJECTION, SOLUTION INTRAVENOUS at 12:05

## 2020-05-31 RX ADMIN — METRONIDAZOLE 500 MG: 500 INJECTION, SOLUTION INTRAVENOUS at 06:05

## 2020-05-31 RX ADMIN — ESCITALOPRAM OXALATE 10 MG: 10 TABLET ORAL at 09:05

## 2020-05-31 RX ADMIN — PANTOPRAZOLE SODIUM 40 MG: 40 INJECTION, POWDER, LYOPHILIZED, FOR SOLUTION INTRAVENOUS at 09:05

## 2020-05-31 NOTE — NURSING TRANSFER
Nursing Transfer Note      5/31/2020     Report received from OR    Transfer From: OR To: [phone]    Transfer via bed    Transfer with pt miguel, tele monitor     Transported by OR staff     Medicines sent: None     Chart send with patient: Yes

## 2020-05-31 NOTE — PROGRESS NOTES
Surgery Progress Note  05/31/2020      Interval HPI  TERESA following surgery last night  No complaints this morning other than Abd pain, controlled on PCA  Glover in place,     AF, HR 80s, BP 130s/80s       Objective  VITAL SIGNS: 24 HR MIN & MAX LAST    Temp  Min: 97.5 °F (36.4 °C)  Max: 98.7 °F (37.1 °C)  98.7 °F (37.1 °C)        BP  Min: 99/56  Max: 149/84  (!) 149/84     Pulse  Min: 81  Max: 91  87     Resp  Min: 15  Max: 39  20    SpO2  Min: 97 %  Max: 100 %  98 %      HT: 5' (152.4 cm)  WT: 63.2 kg (139 lb 5.3 oz)  BMI: 27.2     Physical Exam:  Gen: No acute distress, lying prone   Neuro: Aox3  HEENT: NCAT, EOMI   CV: Regular rate, extremities well perfused   Resp: Normal WOB  Abd: Soft, nondistended, appropriately TTP, no guarding   MSK: No pedal edema    Results  Recent Labs   Lab 05/29/20  0722 05/30/20  0827 05/31/20  0027 05/31/20  0338   WBC 12.01 17.60* 13.02* 13.99*   HGB 12.9 15.3 13.8 12.4   HCT 39.2 45.0 41.2 37.4   * 516* 422* 434*    133* 131* 131*   CO2 28 24 18* 16*   BUN 9 17 18 19   CREATININE 0.59 0.82 0.8 0.8   CALCIUM 9.1 9.2 7.3* 7.6*   MG  --  1.8 1.6 1.6   PHOS  --   --  3.2 3.4   ALKPHOS 79 80 69 70   AMYLASE  --  87  --   --        Asessment/Plan  39 y.o. female s/p ex-lap, antrectomy and Billroth 2 reconstruction for perforated gastric ulcer on 5/30    - NPO with mIVF  - remove Glover   - Trans anastomotic NGT to LIWS   - continue PCA, Toradol, Lyrica   - cipro/flagyl, PPi  - replace lytes PRN  - PT, OT  - promote IS use and OOB today  - transfer to floor this afternoon       CARLO Conner MD  [employer] General Surgery PGY-2
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
## 2020-05-31 NOTE — ANESTHESIA POSTPROCEDURE EVALUATION
Anesthesia Post Evaluation    Patient: Zandra Eckert    Procedure(s) Performed: Procedure(s) (LRB):  LAPAROTOMY, EXPLORATORY (N/A)  SPLENECTOMY (N/A)  GASTRECTOMY    Final Anesthesia Type: general    Patient location during evaluation: ICU  Patient participation: Yes- Able to Participate  Level of consciousness: awake and alert  Post-procedure vital signs: reviewed and stable  Pain management: adequate  Airway patency: patent    PONV status at discharge: No PONV  Anesthetic complications: no      Cardiovascular status: blood pressure returned to baseline  Respiratory status: unassisted  Hydration status: euvolemic  Follow-up not needed.          Vitals Value Taken Time   /82 5/31/2020  6:31 AM   Temp 37.1 °C (98.7 °F) 5/31/2020  3:30 AM   Pulse 84 5/31/2020  6:47 AM   Resp 18 5/31/2020  6:47 AM   SpO2 98 % 5/31/2020  6:47 AM   Vitals shown include unvalidated device data.      No case tracking events are documented in the log.      Pain/Hetal Score: Pain Rating Prior to Med Admin: 6 (5/31/2020  5:49 AM)  Pain Rating Post Med Admin: 8 (slleping, easily arouses) (5/30/2020 12:30 PM)

## 2020-05-31 NOTE — PLAN OF CARE
VN note: VN cued into patient's room for introduction. Sitter at bedside. Patient transferred from ICU. Admission questions not complete. VN attempted to complete questions, however patient asleep. VN reviewed chart. VN will continue to be available to patient and intervene prn.

## 2020-05-31 NOTE — NURSING
Afebrile throughout shift. A&O x 4, follows commands. Pain well controlled with Dilaudid PCA. Surgical dressing intact, scant sanguineous drainage noted. NGT to low cont suction. UO adequate. Pt on RA with sats >95%. NSR. PCT @ bedside. Protective environment maintained. Safety precautions in place. Will cont to monitor and report off to oncoming nurse to assume care.

## 2020-05-31 NOTE — TRANSFER OF CARE
Anesthesia Transfer of Care Note    Patient: Zandra Eckert    Procedure(s) Performed: Procedure(s) (LRB):  LAPAROTOMY, EXPLORATORY (N/A)  SPLENECTOMY (N/A)  GASTRECTOMY    Patient location: ICU    Anesthesia Type: general    Transport from OR: Transported from OR on 6-10 L/min O2 by face mask with adequate spontaneous ventilation    Post pain: adequate analgesia    Post assessment: no apparent anesthetic complications    Post vital signs: stable    Level of consciousness: awake and alert    Nausea/Vomiting: no nausea/vomiting    Complications: none    Transfer of care protocol was followed      Last vitals: There were no vitals taken for this visit.
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
## 2020-05-31 NOTE — NURSING
Patient transferring from room 254 ICU to room K427 via bed upon transport arrival. Ongoing cardiac and face to face monitoring. Report given to oncoming Nurse with opportunity for questions.    Assessment findings:    Patient in bed resting quietly with eyes closed. RR even and unlabored. Drowsy, but arouseable with verbal cues. Able to follow commands. Answers questions appropriately. Follows commands. NAD.  Remains without complaints of pain or discomfort. VS reported, within patients normal  limits.  REJI. Voided additional 475 ml of dark, yellow colored urine to bedpan without difficulty. RR even and unlabored on room air with continuous CO2 monitoring. PIV x 3, current dry and intact. All flush without difficulty. Reports abdomen soft. Non tender at this time. Encouraged increased activity as per tolerance. Bilat SCDS. Dilaudid PCA, paused in support of respirations at times. Ongoing monitoring.     Report given. Questions answered. Return call extension for questions if needed. RN denies further questions at this time.     Will transfer patient with 1:1 sitter and transporter.     Chrissie Peña RN  [phone] ([facility])
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
## 2020-06-01 ENCOUNTER — CLINICAL SUPPORT (OUTPATIENT)
Dept: SMOKING CESSATION | Facility: CLINIC | Age: 40
End: 2020-06-01

## 2020-06-01 DIAGNOSIS — F17.210 CIGARETTE SMOKER: Primary | ICD-10-CM

## 2020-06-01 LAB
ALBUMIN SERPL BCP-MCNC: 2 G/DL (ref 3.5–5.2)
ALP SERPL-CCNC: 69 U/L (ref 55–135)
ALT SERPL W/O P-5'-P-CCNC: 41 U/L (ref 10–44)
ANION GAP SERPL CALC-SCNC: 7 MMOL/L (ref 8–16)
ANISOCYTOSIS BLD QL SMEAR: SLIGHT
AST SERPL-CCNC: 41 U/L (ref 10–40)
BASOPHILS # BLD AUTO: ABNORMAL K/UL (ref 0–0.2)
BASOPHILS NFR BLD: 0 % (ref 0–1.9)
BILIRUB SERPL-MCNC: 0.4 MG/DL (ref 0.1–1)
BUN SERPL-MCNC: 12 MG/DL (ref 6–20)
BURR CELLS BLD QL SMEAR: ABNORMAL
CALCIUM SERPL-MCNC: 8.5 MG/DL (ref 8.7–10.5)
CHLORIDE SERPL-SCNC: 109 MMOL/L (ref 95–110)
CO2 SERPL-SCNC: 23 MMOL/L (ref 23–29)
CREAT SERPL-MCNC: 0.8 MG/DL (ref 0.5–1.4)
DIFFERENTIAL METHOD: ABNORMAL
EOSINOPHIL # BLD AUTO: ABNORMAL K/UL (ref 0–0.5)
EOSINOPHIL NFR BLD: 0 % (ref 0–8)
ERYTHROCYTE [DISTWIDTH] IN BLOOD BY AUTOMATED COUNT: 13.2 % (ref 11.5–14.5)
EST. GFR  (AFRICAN AMERICAN): >60 ML/MIN/1.73 M^2
EST. GFR  (NON AFRICAN AMERICAN): >60 ML/MIN/1.73 M^2
GLUCOSE SERPL-MCNC: 88 MG/DL (ref 70–110)
HCT VFR BLD AUTO: 31.9 % (ref 37–48.5)
HGB BLD-MCNC: 10.3 G/DL (ref 12–16)
IMM GRANULOCYTES # BLD AUTO: ABNORMAL K/UL (ref 0–0.04)
IMM GRANULOCYTES NFR BLD AUTO: ABNORMAL % (ref 0–0.5)
LYMPHOCYTES # BLD AUTO: ABNORMAL K/UL (ref 1–4.8)
LYMPHOCYTES NFR BLD: 7 % (ref 18–48)
MAGNESIUM SERPL-MCNC: 1.9 MG/DL (ref 1.6–2.6)
MCH RBC QN AUTO: 30.1 PG (ref 27–31)
MCHC RBC AUTO-ENTMCNC: 32.3 G/DL (ref 32–36)
MCV RBC AUTO: 93 FL (ref 82–98)
MONOCYTES # BLD AUTO: ABNORMAL K/UL (ref 0.3–1)
MONOCYTES NFR BLD: 3 % (ref 4–15)
NEUTROPHILS NFR BLD: 86 % (ref 38–73)
NEUTS BAND NFR BLD MANUAL: 4 %
NRBC BLD-RTO: 0 /100 WBC
OVALOCYTES BLD QL SMEAR: ABNORMAL
PHOSPHATE SERPL-MCNC: 2 MG/DL (ref 2.7–4.5)
PLATELET # BLD AUTO: 384 K/UL (ref 150–350)
PLATELET BLD QL SMEAR: ABNORMAL
PMV BLD AUTO: 9.8 FL (ref 9.2–12.9)
POCT GLUCOSE: 66 MG/DL (ref 70–110)
POCT GLUCOSE: 67 MG/DL (ref 70–110)
POCT GLUCOSE: 77 MG/DL (ref 70–110)
POCT GLUCOSE: 98 MG/DL (ref 70–110)
POIKILOCYTOSIS BLD QL SMEAR: SLIGHT
POTASSIUM SERPL-SCNC: 4.1 MMOL/L (ref 3.5–5.1)
PROT SERPL-MCNC: 5.4 G/DL (ref 6–8.4)
RBC # BLD AUTO: 3.42 M/UL (ref 4–5.4)
SODIUM SERPL-SCNC: 139 MMOL/L (ref 136–145)
WBC # BLD AUTO: 15.06 K/UL (ref 3.9–12.7)

## 2020-06-01 PROCEDURE — 84100 ASSAY OF PHOSPHORUS: CPT

## 2020-06-01 PROCEDURE — 85027 COMPLETE CBC AUTOMATED: CPT

## 2020-06-01 PROCEDURE — 99999 PR PBB SHADOW E&M-EST. PATIENT-LVL II: ICD-10-PCS | Mod: PBBFAC,,,

## 2020-06-01 PROCEDURE — 94770 HC EXHALED C02 TEST: CPT

## 2020-06-01 PROCEDURE — 90838 PSYTX W PT W E/M 60 MIN: CPT | Mod: S$GLB,,,

## 2020-06-01 PROCEDURE — 99406 BEHAV CHNG SMOKING 3-10 MIN: CPT | Mod: S$GLB,,,

## 2020-06-01 PROCEDURE — 85007 BL SMEAR W/DIFF WBC COUNT: CPT

## 2020-06-01 PROCEDURE — 11000001 HC ACUTE MED/SURG PRIVATE ROOM

## 2020-06-01 PROCEDURE — 25000003 PHARM REV CODE 250: Performed by: HOSPITALIST

## 2020-06-01 PROCEDURE — 99999 PR PBB SHADOW E&M-EST. PATIENT-LVL II: CPT | Mod: PBBFAC,,,

## 2020-06-01 PROCEDURE — 63600175 PHARM REV CODE 636 W HCPCS: Performed by: HOSPITALIST

## 2020-06-01 PROCEDURE — 94799 UNLISTED PULMONARY SVC/PX: CPT

## 2020-06-01 PROCEDURE — C9113 INJ PANTOPRAZOLE SODIUM, VIA: HCPCS | Performed by: HOSPITALIST

## 2020-06-01 PROCEDURE — 80053 COMPREHEN METABOLIC PANEL: CPT

## 2020-06-01 PROCEDURE — 94761 N-INVAS EAR/PLS OXIMETRY MLT: CPT

## 2020-06-01 PROCEDURE — 36415 COLL VENOUS BLD VENIPUNCTURE: CPT

## 2020-06-01 PROCEDURE — 90838 PR PSYCHOTHERAPY W/PATIENT W/E&M, 60 MIN (ADD ON): ICD-10-PCS | Mod: S$GLB,,,

## 2020-06-01 PROCEDURE — 25000003 PHARM REV CODE 250: Performed by: STUDENT IN AN ORGANIZED HEALTH CARE EDUCATION/TRAINING PROGRAM

## 2020-06-01 PROCEDURE — 63600175 PHARM REV CODE 636 W HCPCS: Performed by: FAMILY MEDICINE

## 2020-06-01 PROCEDURE — 83735 ASSAY OF MAGNESIUM: CPT

## 2020-06-01 PROCEDURE — 99900035 HC TECH TIME PER 15 MIN (STAT)

## 2020-06-01 PROCEDURE — 63600175 PHARM REV CODE 636 W HCPCS: Performed by: SURGERY

## 2020-06-01 PROCEDURE — 25000003 PHARM REV CODE 250: Performed by: SURGERY

## 2020-06-01 PROCEDURE — S0030 INJECTION, METRONIDAZOLE: HCPCS | Performed by: HOSPITALIST

## 2020-06-01 PROCEDURE — 99406 PT REFUSED TOBACCO CESSATION: ICD-10-PCS | Mod: S$GLB,,,

## 2020-06-01 RX ORDER — DIPHENHYDRAMINE HYDROCHLORIDE 50 MG/ML
25 INJECTION INTRAMUSCULAR; INTRAVENOUS NIGHTLY PRN
Status: DISCONTINUED | OUTPATIENT
Start: 2020-06-01 | End: 2020-06-05 | Stop reason: HOSPADM

## 2020-06-01 RX ADMIN — DEXTROSE MONOHYDRATE 12.5 G: 25 INJECTION, SOLUTION INTRAVENOUS at 06:06

## 2020-06-01 RX ADMIN — METRONIDAZOLE 500 MG: 500 INJECTION, SOLUTION INTRAVENOUS at 04:06

## 2020-06-01 RX ADMIN — PREGABALIN 75 MG: 75 CAPSULE ORAL at 08:06

## 2020-06-01 RX ADMIN — FLUCONAZOLE 200 MG: 2 INJECTION, SOLUTION INTRAVENOUS at 12:06

## 2020-06-01 RX ADMIN — IRON SUCROSE 100 MG: 20 INJECTION, SOLUTION INTRAVENOUS at 08:06

## 2020-06-01 RX ADMIN — KETOROLAC TROMETHAMINE 10 MG: 30 INJECTION, SOLUTION INTRAMUSCULAR at 05:06

## 2020-06-01 RX ADMIN — PANTOPRAZOLE SODIUM 40 MG: 40 INJECTION, POWDER, LYOPHILIZED, FOR SOLUTION INTRAVENOUS at 09:06

## 2020-06-01 RX ADMIN — METRONIDAZOLE 500 MG: 500 INJECTION, SOLUTION INTRAVENOUS at 09:06

## 2020-06-01 RX ADMIN — KETOROLAC TROMETHAMINE 10 MG: 30 INJECTION, SOLUTION INTRAMUSCULAR at 01:06

## 2020-06-01 RX ADMIN — ESCITALOPRAM OXALATE 10 MG: 10 TABLET ORAL at 09:06

## 2020-06-01 RX ADMIN — DEXTROSE, SODIUM CHLORIDE, AND POTASSIUM CHLORIDE: 5; .45; .15 INJECTION INTRAVENOUS at 04:06

## 2020-06-01 RX ADMIN — PREGABALIN 75 MG: 75 CAPSULE ORAL at 09:06

## 2020-06-01 RX ADMIN — CIPROFLOXACIN 400 MG: 2 INJECTION, SOLUTION INTRAVENOUS at 11:06

## 2020-06-01 RX ADMIN — ONDANSETRON 4 MG: 2 INJECTION INTRAMUSCULAR; INTRAVENOUS at 01:06

## 2020-06-01 RX ADMIN — METRONIDAZOLE 500 MG: 500 INJECTION, SOLUTION INTRAVENOUS at 02:06

## 2020-06-01 RX ADMIN — SODIUM PHOSPHATE, MONOBASIC, MONOHYDRATE 30 MMOL: 276; 142 INJECTION, SOLUTION INTRAVENOUS at 01:06

## 2020-06-01 RX ADMIN — Medication: at 10:06

## 2020-06-01 RX ADMIN — CIPROFLOXACIN 400 MG: 2 INJECTION, SOLUTION INTRAVENOUS at 12:06

## 2020-06-01 RX ADMIN — KETOROLAC TROMETHAMINE 10 MG: 30 INJECTION, SOLUTION INTRAMUSCULAR at 06:06

## 2020-06-01 RX ADMIN — ENOXAPARIN SODIUM 40 MG: 100 INJECTION SUBCUTANEOUS at 04:06

## 2020-06-01 NOTE — PROGRESS NOTES
Smoking cessation education provided.  Pt is a 1 pack per day cigarette smoker x 22 years.  21 mg nicotine patch ordered.  Pt states that she is ready to quit smoking.  Ambulatory referral so Smoking Cessation program following hospital discharge.
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
## 2020-06-01 NOTE — PLAN OF CARE
Plan of care reviewed with patient, understanding verbalized. NG tube on low continuous suction. PCA pump maintained. Con fluids maintained. abd dx D/I with marked dried drainage. Iv abx administered. Sitter at the bedside. Instructed to call for any assistance, understanding verbalized. Bed alarm on, call light in reach, fall precautions in place. Will continue to monitor.
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
## 2020-06-02 LAB
ALBUMIN SERPL BCP-MCNC: 2 G/DL (ref 3.5–5.2)
ALP SERPL-CCNC: 80 U/L (ref 55–135)
ALT SERPL W/O P-5'-P-CCNC: 34 U/L (ref 10–44)
ANION GAP SERPL CALC-SCNC: 9 MMOL/L (ref 8–16)
ANISOCYTOSIS BLD QL SMEAR: SLIGHT
AST SERPL-CCNC: 40 U/L (ref 10–40)
BACTERIA SPEC AEROBE CULT: ABNORMAL
BACTERIA SPEC AEROBE CULT: ABNORMAL
BASOPHILS # BLD AUTO: 0.01 K/UL (ref 0–0.2)
BASOPHILS NFR BLD: 0.1 % (ref 0–1.9)
BILIRUB SERPL-MCNC: 0.5 MG/DL (ref 0.1–1)
BUN SERPL-MCNC: 4 MG/DL (ref 6–20)
BURR CELLS BLD QL SMEAR: ABNORMAL
CALCIUM SERPL-MCNC: 8.1 MG/DL (ref 8.7–10.5)
CHLORIDE SERPL-SCNC: 104 MMOL/L (ref 95–110)
CO2 SERPL-SCNC: 23 MMOL/L (ref 23–29)
CREAT SERPL-MCNC: 0.7 MG/DL (ref 0.5–1.4)
DIFFERENTIAL METHOD: ABNORMAL
EOSINOPHIL # BLD AUTO: 0 K/UL (ref 0–0.5)
EOSINOPHIL NFR BLD: 0.3 % (ref 0–8)
ERYTHROCYTE [DISTWIDTH] IN BLOOD BY AUTOMATED COUNT: 13.3 % (ref 11.5–14.5)
EST. GFR  (AFRICAN AMERICAN): >60 ML/MIN/1.73 M^2
EST. GFR  (NON AFRICAN AMERICAN): >60 ML/MIN/1.73 M^2
GLUCOSE SERPL-MCNC: 84 MG/DL (ref 70–110)
HCT VFR BLD AUTO: 30.4 % (ref 37–48.5)
HGB BLD-MCNC: 10.4 G/DL (ref 12–16)
IMM GRANULOCYTES # BLD AUTO: 0.07 K/UL (ref 0–0.04)
IMM GRANULOCYTES NFR BLD AUTO: 0.5 % (ref 0–0.5)
LYMPHOCYTES # BLD AUTO: 0.8 K/UL (ref 1–4.8)
LYMPHOCYTES NFR BLD: 6 % (ref 18–48)
MAGNESIUM SERPL-MCNC: 1.9 MG/DL (ref 1.6–2.6)
MCH RBC QN AUTO: 31 PG (ref 27–31)
MCHC RBC AUTO-ENTMCNC: 34.2 G/DL (ref 32–36)
MCV RBC AUTO: 91 FL (ref 82–98)
MONOCYTES # BLD AUTO: 0.4 K/UL (ref 0.3–1)
MONOCYTES NFR BLD: 2.7 % (ref 4–15)
NEUTROPHILS # BLD AUTO: 12.6 K/UL (ref 1.8–7.7)
NEUTROPHILS NFR BLD: 90.4 % (ref 38–73)
NRBC BLD-RTO: 0 /100 WBC
OVALOCYTES BLD QL SMEAR: ABNORMAL
PHOSPHATE SERPL-MCNC: 3.4 MG/DL (ref 2.7–4.5)
PLATELET # BLD AUTO: 335 K/UL (ref 150–350)
PLATELET BLD QL SMEAR: ABNORMAL
PMV BLD AUTO: 10.1 FL (ref 9.2–12.9)
POCT GLUCOSE: 101 MG/DL (ref 70–110)
POCT GLUCOSE: 102 MG/DL (ref 70–110)
POCT GLUCOSE: 103 MG/DL (ref 70–110)
POIKILOCYTOSIS BLD QL SMEAR: SLIGHT
POTASSIUM SERPL-SCNC: 3.4 MMOL/L (ref 3.5–5.1)
PROT SERPL-MCNC: 5.6 G/DL (ref 6–8.4)
RBC # BLD AUTO: 3.36 M/UL (ref 4–5.4)
SODIUM SERPL-SCNC: 136 MMOL/L (ref 136–145)
WBC # BLD AUTO: 13.87 K/UL (ref 3.9–12.7)

## 2020-06-02 PROCEDURE — 94799 UNLISTED PULMONARY SVC/PX: CPT

## 2020-06-02 PROCEDURE — 94761 N-INVAS EAR/PLS OXIMETRY MLT: CPT

## 2020-06-02 PROCEDURE — 63600175 PHARM REV CODE 636 W HCPCS: Performed by: STUDENT IN AN ORGANIZED HEALTH CARE EDUCATION/TRAINING PROGRAM

## 2020-06-02 PROCEDURE — 36415 COLL VENOUS BLD VENIPUNCTURE: CPT

## 2020-06-02 PROCEDURE — 80053 COMPREHEN METABOLIC PANEL: CPT

## 2020-06-02 PROCEDURE — 63600175 PHARM REV CODE 636 W HCPCS: Performed by: SURGERY

## 2020-06-02 PROCEDURE — S0030 INJECTION, METRONIDAZOLE: HCPCS | Performed by: HOSPITALIST

## 2020-06-02 PROCEDURE — 84100 ASSAY OF PHOSPHORUS: CPT

## 2020-06-02 PROCEDURE — 63600175 PHARM REV CODE 636 W HCPCS: Performed by: FAMILY MEDICINE

## 2020-06-02 PROCEDURE — 25000003 PHARM REV CODE 250: Performed by: STUDENT IN AN ORGANIZED HEALTH CARE EDUCATION/TRAINING PROGRAM

## 2020-06-02 PROCEDURE — C9113 INJ PANTOPRAZOLE SODIUM, VIA: HCPCS | Performed by: HOSPITALIST

## 2020-06-02 PROCEDURE — 94770 HC EXHALED C02 TEST: CPT

## 2020-06-02 PROCEDURE — 11000001 HC ACUTE MED/SURG PRIVATE ROOM

## 2020-06-02 PROCEDURE — 99900035 HC TECH TIME PER 15 MIN (STAT)

## 2020-06-02 PROCEDURE — 85025 COMPLETE CBC W/AUTO DIFF WBC: CPT

## 2020-06-02 PROCEDURE — 25000003 PHARM REV CODE 250: Performed by: SURGERY

## 2020-06-02 PROCEDURE — 83735 ASSAY OF MAGNESIUM: CPT

## 2020-06-02 PROCEDURE — 63600175 PHARM REV CODE 636 W HCPCS: Performed by: HOSPITALIST

## 2020-06-02 PROCEDURE — 25000003 PHARM REV CODE 250: Performed by: HOSPITALIST

## 2020-06-02 RX ORDER — ACETAMINOPHEN 500 MG
1000 TABLET ORAL EVERY 8 HOURS
Status: DISCONTINUED | OUTPATIENT
Start: 2020-06-02 | End: 2020-06-05 | Stop reason: HOSPADM

## 2020-06-02 RX ORDER — POTASSIUM CHLORIDE 7.45 MG/ML
10 INJECTION INTRAVENOUS
Status: COMPLETED | OUTPATIENT
Start: 2020-06-02 | End: 2020-06-02

## 2020-06-02 RX ORDER — OXYCODONE HYDROCHLORIDE 5 MG/1
10 TABLET ORAL EVERY 6 HOURS PRN
Status: DISCONTINUED | OUTPATIENT
Start: 2020-06-02 | End: 2020-06-05 | Stop reason: HOSPADM

## 2020-06-02 RX ORDER — OXYCODONE HYDROCHLORIDE 5 MG/1
5 TABLET ORAL EVERY 6 HOURS PRN
Status: DISCONTINUED | OUTPATIENT
Start: 2020-06-02 | End: 2020-06-05 | Stop reason: HOSPADM

## 2020-06-02 RX ORDER — METHOCARBAMOL 100 MG/ML
500 INJECTION, SOLUTION INTRAMUSCULAR; INTRAVENOUS EVERY 8 HOURS
Status: DISCONTINUED | OUTPATIENT
Start: 2020-06-02 | End: 2020-06-05 | Stop reason: HOSPADM

## 2020-06-02 RX ORDER — MORPHINE SULFATE 2 MG/ML
2 INJECTION, SOLUTION INTRAMUSCULAR; INTRAVENOUS
Status: DISCONTINUED | OUTPATIENT
Start: 2020-06-02 | End: 2020-06-05 | Stop reason: HOSPADM

## 2020-06-02 RX ADMIN — CIPROFLOXACIN 400 MG: 2 INJECTION, SOLUTION INTRAVENOUS at 04:06

## 2020-06-02 RX ADMIN — POTASSIUM CHLORIDE 10 MEQ: 10 INJECTION, SOLUTION INTRAVENOUS at 04:06

## 2020-06-02 RX ADMIN — ACETAMINOPHEN 1000 MG: 500 TABLET ORAL at 09:06

## 2020-06-02 RX ADMIN — ESCITALOPRAM OXALATE 10 MG: 10 TABLET ORAL at 09:06

## 2020-06-02 RX ADMIN — METRONIDAZOLE 500 MG: 500 INJECTION, SOLUTION INTRAVENOUS at 04:06

## 2020-06-02 RX ADMIN — METRONIDAZOLE 500 MG: 500 INJECTION, SOLUTION INTRAVENOUS at 09:06

## 2020-06-02 RX ADMIN — PANTOPRAZOLE SODIUM 40 MG: 40 INJECTION, POWDER, LYOPHILIZED, FOR SOLUTION INTRAVENOUS at 09:06

## 2020-06-02 RX ADMIN — POTASSIUM CHLORIDE 10 MEQ: 10 INJECTION, SOLUTION INTRAVENOUS at 07:06

## 2020-06-02 RX ADMIN — POTASSIUM CHLORIDE 10 MEQ: 10 INJECTION, SOLUTION INTRAVENOUS at 09:06

## 2020-06-02 RX ADMIN — PREGABALIN 75 MG: 75 CAPSULE ORAL at 09:06

## 2020-06-02 RX ADMIN — POTASSIUM CHLORIDE 10 MEQ: 10 INJECTION, SOLUTION INTRAVENOUS at 08:06

## 2020-06-02 RX ADMIN — METRONIDAZOLE 500 MG: 500 INJECTION, SOLUTION INTRAVENOUS at 02:06

## 2020-06-02 RX ADMIN — Medication: at 06:06

## 2020-06-02 RX ADMIN — DEXTROSE, SODIUM CHLORIDE, AND POTASSIUM CHLORIDE: 5; .45; .15 INJECTION INTRAVENOUS at 04:06

## 2020-06-02 RX ADMIN — FLUCONAZOLE 200 MG: 2 INJECTION, SOLUTION INTRAVENOUS at 01:06

## 2020-06-02 RX ADMIN — IRON SUCROSE 100 MG: 20 INJECTION, SOLUTION INTRAVENOUS at 09:06

## 2020-06-02 RX ADMIN — OXYCODONE HYDROCHLORIDE 10 MG: 5 TABLET ORAL at 09:06

## 2020-06-02 RX ADMIN — ENOXAPARIN SODIUM 40 MG: 100 INJECTION SUBCUTANEOUS at 04:06

## 2020-06-02 RX ADMIN — METHOCARBAMOL 500 MG: 1000 INJECTION, SOLUTION INTRAMUSCULAR; INTRAVENOUS at 09:06

## 2020-06-02 NOTE — NURSING
Plan of care reviewed with patient. VoiceD understanding. No acute distress noted. Side rails x3, bed low, call bell within reach. Maintain bed alarm for patient safety. Patient will be monitored overnight.

## 2020-06-02 NOTE — PLAN OF CARE
Pt remains CEC'd for suicidal ideations expressed while at [facility].  Will discuss with primary team possible Psych Consult for eval.  This will be required if inpt psych placement needed at d/c.  Will await return call or Psych Consult from team.    Abby Lindsay, RN    [phone]

## 2020-06-02 NOTE — PLAN OF CARE
Problem: Adult Inpatient Plan of Care  Goal: Plan of Care Review    Patient is awake and orientedx4. Care plan explained to patient; she verbalized understanding. On room air, O2 saturation at 93-95%. No telemetry. Sitter at bedside. No n/v/d during shift. PCA pump in use. NG tube in place to low continuous suction. Due medications given. Accuchecks completed every 6 hours. Encouraged to turn every 2 hours as tolerated. Maintained on fall risk precaution. Bed in lowest position, bed alarm on, call light/personal items within reach and instructed to call for help when needed. Will continue to monitor.    Patient slept well overnight    Outcome: Ongoing, Progressing

## 2020-06-02 NOTE — PROGRESS NOTES
Surgery Progress Note  06/02/2020      Interval HPI  NAEON  NPO with NGT  Was able to get some sleep  Biggest complaint is throat soreness from NGT  + flatus, no bm    Objective  VITAL SIGNS: 24 HR MIN & MAX LAST    Temp  Min: 98.8 °F (37.1 °C)  Max: 99.5 °F (37.5 °C)  99 °F (37.2 °C)        BP  Min: 121/71  Max: 137/74  137/74     Pulse  Min: 79  Max: 109  109     Resp  Min: 12  Max: 20  18    SpO2  Min: 93 %  Max: 98 %  98 %      HT: 5' (152.4 cm)  WT: 64.5 kg (142 lb 3.2 oz)  BMI: 27.8     Physical Exam:  Gen: No acute distress, lying prone   Neuro: Aox3  HEENT: NCAT, EOMI   CV: Regular rate, extremities well perfused   Resp: Normal WOB  Abd: Soft, nondistended, appropriately TTP, no guarding   MSK: No pedal edema    Results  Recent Labs   Lab 05/30/20  0827 05/31/20  0027 05/31/20  0338 06/01/20  0359 06/02/20  0408   WBC 17.60* 13.02* 13.99* 15.06* 13.87*   HGB 15.3 13.8 12.4 10.3* 10.4*   HCT 45.0 41.2 37.4 31.9* 30.4*   * 422* 434* 384* 335   * 131* 131* 139 136   CO2 24 18* 16* 23 23   BUN 17 18 19 12 4*   CREATININE 0.82 0.8 0.8 0.8 0.7   CALCIUM 9.2 7.3* 7.6* 8.5* 8.1*   MG 1.8 1.6 1.6 1.9 1.9   PHOS  --  3.2 3.4 2.0* 3.4   ALKPHOS 80 69 70 69 80   AMYLASE 87  --   --   --   --        Asessment/Plan  39 y.o. female s/p ex-lap, antrectomy and Billroth 2 reconstruction for perforated gastric ulcer on 5/30    - coninue NPO with mIVF  - Trans anastomotic NGT to LIWS  -likely CLD and clamping trial tomorrow  - continue PCA, Toradol, Lyrica   - cipro/flagyl, PPI  - replace lytes PRN  - PT, OT  -encourage IS and ambulation    Pearl Eaton MD   [employer] General Surgery PGY2

## 2020-06-02 NOTE — PLAN OF CARE
Reviewed plan of care with patient. Patient verbalized understanding of care.Patient turned q2. Co2 and RR monitored. Blood glucose monitored. Patient is currently on room air. NG tube to gravity. PCA pump patient button within reach.Sitter at bed side. No SOB or signs/symptoms of distress noted. Bed locked and low, call light within reach, bed alarm on, non skid socks applied, fall risk band applied. Instructed to call if needing assistance. Will continue to monitor.

## 2020-06-02 NOTE — PLAN OF CARE
VN cued into pt's room for introduction. VN informed pt that VN would be working along side bedside nurse and PCT throughout shift. Level of present pain assessed. At present pain manageable. NGT remains intact. Patient states feeling a little bit better. Discussed with patient High fall risk protocol and interventions that have been initiated and cont be in place for safety. Patient verbalized clear understanding and cooperation using teach back method. Bed alarm presently activated and in use. Will cont to be available to patient and intervene prn.

## 2020-06-03 LAB
ALBUMIN SERPL BCP-MCNC: 1.9 G/DL (ref 3.5–5.2)
ALP SERPL-CCNC: 113 U/L (ref 55–135)
ALT SERPL W/O P-5'-P-CCNC: 28 U/L (ref 10–44)
ANION GAP SERPL CALC-SCNC: 6 MMOL/L (ref 8–16)
AST SERPL-CCNC: 24 U/L (ref 10–40)
BASOPHILS # BLD AUTO: 0.02 K/UL (ref 0–0.2)
BASOPHILS NFR BLD: 0.1 % (ref 0–1.9)
BILIRUB SERPL-MCNC: 0.5 MG/DL (ref 0.1–1)
BUN SERPL-MCNC: 4 MG/DL (ref 6–20)
CALCIUM SERPL-MCNC: 8.3 MG/DL (ref 8.7–10.5)
CHLORIDE SERPL-SCNC: 101 MMOL/L (ref 95–110)
CO2 SERPL-SCNC: 30 MMOL/L (ref 23–29)
CREAT SERPL-MCNC: 0.7 MG/DL (ref 0.5–1.4)
DIFFERENTIAL METHOD: ABNORMAL
EOSINOPHIL # BLD AUTO: 0.2 K/UL (ref 0–0.5)
EOSINOPHIL NFR BLD: 1.2 % (ref 0–8)
ERYTHROCYTE [DISTWIDTH] IN BLOOD BY AUTOMATED COUNT: 13 % (ref 11.5–14.5)
EST. GFR  (AFRICAN AMERICAN): >60 ML/MIN/1.73 M^2
EST. GFR  (NON AFRICAN AMERICAN): >60 ML/MIN/1.73 M^2
GLUCOSE SERPL-MCNC: 104 MG/DL (ref 70–110)
HCT VFR BLD AUTO: 30.1 % (ref 37–48.5)
HGB BLD-MCNC: 10.1 G/DL (ref 12–16)
IMM GRANULOCYTES # BLD AUTO: 0.13 K/UL (ref 0–0.04)
IMM GRANULOCYTES NFR BLD AUTO: 0.9 % (ref 0–0.5)
LYMPHOCYTES # BLD AUTO: 0.9 K/UL (ref 1–4.8)
LYMPHOCYTES NFR BLD: 5.9 % (ref 18–48)
MAGNESIUM SERPL-MCNC: 1.8 MG/DL (ref 1.6–2.6)
MCH RBC QN AUTO: 30.3 PG (ref 27–31)
MCHC RBC AUTO-ENTMCNC: 33.6 G/DL (ref 32–36)
MCV RBC AUTO: 90 FL (ref 82–98)
MONOCYTES # BLD AUTO: 0.8 K/UL (ref 0.3–1)
MONOCYTES NFR BLD: 5.6 % (ref 4–15)
NEUTROPHILS # BLD AUTO: 12.6 K/UL (ref 1.8–7.7)
NEUTROPHILS NFR BLD: 2 % (ref 38–73)
NRBC BLD-RTO: 0 /100 WBC
PHOSPHATE SERPL-MCNC: 3.2 MG/DL (ref 2.7–4.5)
PLATELET # BLD AUTO: ABNORMAL K/UL (ref 150–350)
PLATELET BLD QL SMEAR: ABNORMAL
PMV BLD AUTO: ABNORMAL FL (ref 9.2–12.9)
POCT GLUCOSE: 104 MG/DL (ref 70–110)
POCT GLUCOSE: 105 MG/DL (ref 70–110)
POCT GLUCOSE: 95 MG/DL (ref 70–110)
POTASSIUM SERPL-SCNC: 3.3 MMOL/L (ref 3.5–5.1)
PROT SERPL-MCNC: 5.6 G/DL (ref 6–8.4)
RBC # BLD AUTO: 3.33 M/UL (ref 4–5.4)
SODIUM SERPL-SCNC: 137 MMOL/L (ref 136–145)
WBC # BLD AUTO: 14.64 K/UL (ref 3.9–12.7)

## 2020-06-03 PROCEDURE — 63600175 PHARM REV CODE 636 W HCPCS: Performed by: FAMILY MEDICINE

## 2020-06-03 PROCEDURE — 63600175 PHARM REV CODE 636 W HCPCS: Performed by: STUDENT IN AN ORGANIZED HEALTH CARE EDUCATION/TRAINING PROGRAM

## 2020-06-03 PROCEDURE — 83735 ASSAY OF MAGNESIUM: CPT

## 2020-06-03 PROCEDURE — 94799 UNLISTED PULMONARY SVC/PX: CPT

## 2020-06-03 PROCEDURE — 25000003 PHARM REV CODE 250: Performed by: STUDENT IN AN ORGANIZED HEALTH CARE EDUCATION/TRAINING PROGRAM

## 2020-06-03 PROCEDURE — 36415 COLL VENOUS BLD VENIPUNCTURE: CPT

## 2020-06-03 PROCEDURE — 84100 ASSAY OF PHOSPHORUS: CPT

## 2020-06-03 PROCEDURE — 63600175 PHARM REV CODE 636 W HCPCS: Performed by: SURGERY

## 2020-06-03 PROCEDURE — 80053 COMPREHEN METABOLIC PANEL: CPT

## 2020-06-03 PROCEDURE — S0030 INJECTION, METRONIDAZOLE: HCPCS | Performed by: HOSPITALIST

## 2020-06-03 PROCEDURE — 63600175 PHARM REV CODE 636 W HCPCS: Performed by: HOSPITALIST

## 2020-06-03 PROCEDURE — 85025 COMPLETE CBC W/AUTO DIFF WBC: CPT

## 2020-06-03 PROCEDURE — 25000003 PHARM REV CODE 250: Performed by: HOSPITALIST

## 2020-06-03 PROCEDURE — 94761 N-INVAS EAR/PLS OXIMETRY MLT: CPT

## 2020-06-03 PROCEDURE — 11000001 HC ACUTE MED/SURG PRIVATE ROOM

## 2020-06-03 PROCEDURE — 99900035 HC TECH TIME PER 15 MIN (STAT)

## 2020-06-03 PROCEDURE — C9113 INJ PANTOPRAZOLE SODIUM, VIA: HCPCS | Performed by: HOSPITALIST

## 2020-06-03 PROCEDURE — 25000003 PHARM REV CODE 250: Performed by: SURGERY

## 2020-06-03 RX ORDER — POTASSIUM CHLORIDE 7.45 MG/ML
40 INJECTION INTRAVENOUS ONCE
Status: DISCONTINUED | OUTPATIENT
Start: 2020-06-03 | End: 2020-06-03

## 2020-06-03 RX ORDER — POTASSIUM CHLORIDE 7.45 MG/ML
10 INJECTION INTRAVENOUS
Status: COMPLETED | OUTPATIENT
Start: 2020-06-03 | End: 2020-06-03

## 2020-06-03 RX ADMIN — METRONIDAZOLE 500 MG: 500 INJECTION, SOLUTION INTRAVENOUS at 08:06

## 2020-06-03 RX ADMIN — POTASSIUM CHLORIDE 10 MEQ: 7.46 INJECTION, SOLUTION INTRAVENOUS at 02:06

## 2020-06-03 RX ADMIN — ENOXAPARIN SODIUM 40 MG: 100 INJECTION SUBCUTANEOUS at 04:06

## 2020-06-03 RX ADMIN — METRONIDAZOLE 500 MG: 500 INJECTION, SOLUTION INTRAVENOUS at 02:06

## 2020-06-03 RX ADMIN — CIPROFLOXACIN 400 MG: 2 INJECTION, SOLUTION INTRAVENOUS at 11:06

## 2020-06-03 RX ADMIN — METHOCARBAMOL 500 MG: 1000 INJECTION, SOLUTION INTRAMUSCULAR; INTRAVENOUS at 04:06

## 2020-06-03 RX ADMIN — OXYCODONE HYDROCHLORIDE 10 MG: 5 TABLET ORAL at 02:06

## 2020-06-03 RX ADMIN — PANTOPRAZOLE SODIUM 40 MG: 40 INJECTION, POWDER, LYOPHILIZED, FOR SOLUTION INTRAVENOUS at 08:06

## 2020-06-03 RX ADMIN — PREGABALIN 75 MG: 75 CAPSULE ORAL at 09:06

## 2020-06-03 RX ADMIN — PREGABALIN 75 MG: 75 CAPSULE ORAL at 08:06

## 2020-06-03 RX ADMIN — POTASSIUM CHLORIDE 10 MEQ: 7.46 INJECTION, SOLUTION INTRAVENOUS at 11:06

## 2020-06-03 RX ADMIN — OXYCODONE HYDROCHLORIDE 10 MG: 5 TABLET ORAL at 08:06

## 2020-06-03 RX ADMIN — OXYCODONE HYDROCHLORIDE 10 MG: 5 TABLET ORAL at 09:06

## 2020-06-03 RX ADMIN — POTASSIUM CHLORIDE 10 MEQ: 7.46 INJECTION, SOLUTION INTRAVENOUS at 05:06

## 2020-06-03 RX ADMIN — ACETAMINOPHEN 1000 MG: 500 TABLET ORAL at 05:06

## 2020-06-03 RX ADMIN — DEXTROSE, SODIUM CHLORIDE, AND POTASSIUM CHLORIDE: 5; .45; .15 INJECTION INTRAVENOUS at 05:06

## 2020-06-03 RX ADMIN — FLUCONAZOLE 200 MG: 2 INJECTION, SOLUTION INTRAVENOUS at 02:06

## 2020-06-03 RX ADMIN — MORPHINE SULFATE 2 MG: 2 INJECTION, SOLUTION INTRAMUSCULAR; INTRAVENOUS at 02:06

## 2020-06-03 RX ADMIN — ACETAMINOPHEN 1000 MG: 500 TABLET ORAL at 02:06

## 2020-06-03 RX ADMIN — POTASSIUM CHLORIDE 10 MEQ: 7.46 INJECTION, SOLUTION INTRAVENOUS at 04:06

## 2020-06-03 RX ADMIN — CIPROFLOXACIN 400 MG: 2 INJECTION, SOLUTION INTRAVENOUS at 12:06

## 2020-06-03 RX ADMIN — METRONIDAZOLE 500 MG: 500 INJECTION, SOLUTION INTRAVENOUS at 05:06

## 2020-06-03 RX ADMIN — DIPHENHYDRAMINE HYDROCHLORIDE 25 MG: 50 INJECTION, SOLUTION INTRAMUSCULAR; INTRAVENOUS at 09:06

## 2020-06-03 RX ADMIN — METHOCARBAMOL 500 MG: 1000 INJECTION, SOLUTION INTRAMUSCULAR; INTRAVENOUS at 05:06

## 2020-06-03 RX ADMIN — IRON SUCROSE 100 MG: 20 INJECTION, SOLUTION INTRAVENOUS at 08:06

## 2020-06-03 RX ADMIN — ESCITALOPRAM OXALATE 10 MG: 10 TABLET ORAL at 08:06

## 2020-06-03 RX ADMIN — METHOCARBAMOL 500 MG: 1000 INJECTION, SOLUTION INTRAMUSCULAR; INTRAVENOUS at 09:06

## 2020-06-03 NOTE — PLAN OF CARE
Patient's VS are stable, all three pivs are intact, NG tube is still intact and hooked up to low continuous suction. Patient's PCA pump was D/C around 1800 on 6/2. Patient was given oxycodone 2x prn for complaints of pain. Midline abd dressing is dry clean and intact. Patient did have an episode of pink/red urine @ 0159, general surgery was contacted and was told to monitor. Morning urine output @ 0331 was yellow. Currently monitoring

## 2020-06-03 NOTE — PHYSICIAN QUERY
"PT Name: Zandra Eckert  MR #: [id number]     ACUITY OF CONDITION CLARIFICATION    Skyla Flower RN, CCDS; [email or web address]    This form is a permanent document in the medical record.     Query Date: Poornima 3, 2020    By submitting this query, we are merely seeking further clarification of documentation to reflect the severity of illness of your patient. Please utilize your independent clinical judgment when addressing the question(s) below.    The Medical record reflects the following:     Indicators   Supporting Clinical Findings Location in Medical Record   x Documentation of condition perforated Large posterior gastric ulcer exactly at pylorus Op Note 5/30    Lab Value(s)     x Radiology Findings Impression     There is oral contrast free within the peritoneal cavity as well as free intraperitoneal air.  This is consistent with perforation of hollow viscus.  The posterior wall of the stomach appears irregular highly concerning for perforation at this location.  Recommend surgical consultation.  Moderate ascites within the lower abdomen and pelvis.    CT Scan 5/30   x Treatment/Medication Procedure:   exp lap  splenectomy  distal gastrectomy w/uncut Errol Y Gastrojejunostomy  splenix flexure mobilization    post op dx:   perforated Large posterior  exactly at pylorus  Large splenic mass  Whole abdomen with enteric content    ebl: 300 ml Op Note 5/30    Other        Provider, please specify the acuity/chronicity of "gastric ulcer"     [ xxx  ] Acute   [   ] Chronic   [   ] Acute on chronic   [   ] Other - specify: ______________   [   ]    Clinically Undetermined     Please document in your progress notes daily for the duration of treatment until resolved, and include in your discharge summary.  "
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
## 2020-06-03 NOTE — PROGRESS NOTES
Rx message.The order for 40 meq IV PB is changed to 10 meq/100 ml Q 1 hour IV PB Q 4 bags for total of 40 meq

## 2020-06-03 NOTE — PLAN OF CARE
Pt remains with NGT clamped at this time, now on PO pain meds.   Pt asleep in room, sitter at bedside.  Dr Parra with Psychiatry is aware of consult and will be seeing pt later today.  Will continue to follow for d/c assistance.  Pt has been provided resources for women's shelter and been seen by SW.  Pt states she will be going home with family in [place] at time of d/c.      Will await Psych recs at time d/c.    Abby Lindsay RN    [phone]

## 2020-06-03 NOTE — NURSING
Plan of care reviewed with patient. Voiced understanding. NG tube clamped off by MD. No acute distress noted at this time. Side rails x3, bed low, call bell within reach. Maintain bed alarm for patient safety. Patient will be monitored overnight.

## 2020-06-03 NOTE — CONSULTS
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  Rule out bipolar disorder  I am going to talk to cousin about disposition medications and current stressors.  Will continue Lexapro for her depression and anxiety.  Will consider the option of sending the patient to psych facility after speaking with cousin

## 2020-06-04 LAB
ALBUMIN SERPL BCP-MCNC: 1.8 G/DL (ref 3.5–5.2)
ALP SERPL-CCNC: 80 U/L (ref 55–135)
ALT SERPL W/O P-5'-P-CCNC: 20 U/L (ref 10–44)
ANION GAP SERPL CALC-SCNC: 4 MMOL/L (ref 8–16)
AST SERPL-CCNC: 14 U/L (ref 10–40)
BACTERIA SPEC ANAEROBE CULT: NORMAL
BASOPHILS # BLD AUTO: 0.03 K/UL (ref 0–0.2)
BASOPHILS NFR BLD: 0.2 % (ref 0–1.9)
BILIRUB SERPL-MCNC: 0.3 MG/DL (ref 0.1–1)
BUN SERPL-MCNC: 4 MG/DL (ref 6–20)
CALCIUM SERPL-MCNC: 7.9 MG/DL (ref 8.7–10.5)
CHLORIDE SERPL-SCNC: 100 MMOL/L (ref 95–110)
CO2 SERPL-SCNC: 28 MMOL/L (ref 23–29)
CREAT SERPL-MCNC: 0.8 MG/DL (ref 0.5–1.4)
DIFFERENTIAL METHOD: ABNORMAL
EOSINOPHIL # BLD AUTO: 0.3 K/UL (ref 0–0.5)
EOSINOPHIL NFR BLD: 2 % (ref 0–8)
ERYTHROCYTE [DISTWIDTH] IN BLOOD BY AUTOMATED COUNT: 13.3 % (ref 11.5–14.5)
EST. GFR  (AFRICAN AMERICAN): >60 ML/MIN/1.73 M^2
EST. GFR  (NON AFRICAN AMERICAN): >60 ML/MIN/1.73 M^2
GLUCOSE SERPL-MCNC: 394 MG/DL (ref 70–110)
HCT VFR BLD AUTO: 26.4 % (ref 37–48.5)
HGB BLD-MCNC: 8.8 G/DL (ref 12–16)
IMM GRANULOCYTES # BLD AUTO: 0.23 K/UL (ref 0–0.04)
IMM GRANULOCYTES NFR BLD AUTO: 1.5 % (ref 0–0.5)
LYMPHOCYTES # BLD AUTO: 1.4 K/UL (ref 1–4.8)
LYMPHOCYTES NFR BLD: 9 % (ref 18–48)
MAGNESIUM SERPL-MCNC: 1.6 MG/DL (ref 1.6–2.6)
MCH RBC QN AUTO: 31.1 PG (ref 27–31)
MCHC RBC AUTO-ENTMCNC: 33.3 G/DL (ref 32–36)
MCV RBC AUTO: 93 FL (ref 82–98)
MONOCYTES # BLD AUTO: 1.3 K/UL (ref 0.3–1)
MONOCYTES NFR BLD: 8.3 % (ref 4–15)
NEUTROPHILS # BLD AUTO: 12.3 K/UL (ref 1.8–7.7)
NEUTROPHILS NFR BLD: 79 % (ref 38–73)
NRBC BLD-RTO: 0 /100 WBC
PHOSPHATE SERPL-MCNC: 2.8 MG/DL (ref 2.7–4.5)
PLATELET # BLD AUTO: 416 K/UL (ref 150–350)
PMV BLD AUTO: 9.1 FL (ref 9.2–12.9)
POCT GLUCOSE: 107 MG/DL (ref 70–110)
POCT GLUCOSE: 86 MG/DL (ref 70–110)
POCT GLUCOSE: 88 MG/DL (ref 70–110)
POTASSIUM SERPL-SCNC: 5.5 MMOL/L (ref 3.5–5.1)
PROT SERPL-MCNC: 5.4 G/DL (ref 6–8.4)
RBC # BLD AUTO: 2.83 M/UL (ref 4–5.4)
SODIUM SERPL-SCNC: 132 MMOL/L (ref 136–145)
WBC # BLD AUTO: 15.58 K/UL (ref 3.9–12.7)

## 2020-06-04 PROCEDURE — 63600175 PHARM REV CODE 636 W HCPCS: Performed by: HOSPITALIST

## 2020-06-04 PROCEDURE — 63600175 PHARM REV CODE 636 W HCPCS: Performed by: FAMILY MEDICINE

## 2020-06-04 PROCEDURE — 25000003 PHARM REV CODE 250: Performed by: STUDENT IN AN ORGANIZED HEALTH CARE EDUCATION/TRAINING PROGRAM

## 2020-06-04 PROCEDURE — 83735 ASSAY OF MAGNESIUM: CPT

## 2020-06-04 PROCEDURE — 80053 COMPREHEN METABOLIC PANEL: CPT

## 2020-06-04 PROCEDURE — 25000003 PHARM REV CODE 250: Performed by: SURGERY

## 2020-06-04 PROCEDURE — 63600175 PHARM REV CODE 636 W HCPCS: Performed by: SURGERY

## 2020-06-04 PROCEDURE — 97161 PT EVAL LOW COMPLEX 20 MIN: CPT

## 2020-06-04 PROCEDURE — C9113 INJ PANTOPRAZOLE SODIUM, VIA: HCPCS | Performed by: HOSPITALIST

## 2020-06-04 PROCEDURE — 94761 N-INVAS EAR/PLS OXIMETRY MLT: CPT

## 2020-06-04 PROCEDURE — 63600175 PHARM REV CODE 636 W HCPCS: Performed by: STUDENT IN AN ORGANIZED HEALTH CARE EDUCATION/TRAINING PROGRAM

## 2020-06-04 PROCEDURE — 84100 ASSAY OF PHOSPHORUS: CPT

## 2020-06-04 PROCEDURE — S0030 INJECTION, METRONIDAZOLE: HCPCS | Performed by: HOSPITALIST

## 2020-06-04 PROCEDURE — 11000001 HC ACUTE MED/SURG PRIVATE ROOM

## 2020-06-04 PROCEDURE — 36415 COLL VENOUS BLD VENIPUNCTURE: CPT

## 2020-06-04 PROCEDURE — 99900035 HC TECH TIME PER 15 MIN (STAT)

## 2020-06-04 PROCEDURE — 85025 COMPLETE CBC W/AUTO DIFF WBC: CPT

## 2020-06-04 PROCEDURE — 25000003 PHARM REV CODE 250: Performed by: HOSPITALIST

## 2020-06-04 PROCEDURE — 94799 UNLISTED PULMONARY SVC/PX: CPT

## 2020-06-04 RX ORDER — POLYETHYLENE GLYCOL 3350 17 G/17G
17 POWDER, FOR SOLUTION ORAL 2 TIMES DAILY
Status: COMPLETED | OUTPATIENT
Start: 2020-06-04 | End: 2020-06-05

## 2020-06-04 RX ORDER — AMOXICILLIN 250 MG
1 CAPSULE ORAL 2 TIMES DAILY
Status: DISCONTINUED | OUTPATIENT
Start: 2020-06-04 | End: 2020-06-05 | Stop reason: HOSPADM

## 2020-06-04 RX ADMIN — METHOCARBAMOL 500 MG: 1000 INJECTION, SOLUTION INTRAMUSCULAR; INTRAVENOUS at 09:06

## 2020-06-04 RX ADMIN — DEXTROSE, SODIUM CHLORIDE, AND POTASSIUM CHLORIDE: 5; .45; .15 INJECTION INTRAVENOUS at 02:06

## 2020-06-04 RX ADMIN — PREGABALIN 75 MG: 75 CAPSULE ORAL at 09:06

## 2020-06-04 RX ADMIN — FLUCONAZOLE 200 MG: 2 INJECTION, SOLUTION INTRAVENOUS at 12:06

## 2020-06-04 RX ADMIN — METHOCARBAMOL 500 MG: 1000 INJECTION, SOLUTION INTRAMUSCULAR; INTRAVENOUS at 02:06

## 2020-06-04 RX ADMIN — PANTOPRAZOLE SODIUM 40 MG: 40 INJECTION, POWDER, LYOPHILIZED, FOR SOLUTION INTRAVENOUS at 09:06

## 2020-06-04 RX ADMIN — ACETAMINOPHEN 1000 MG: 500 TABLET ORAL at 05:06

## 2020-06-04 RX ADMIN — ESCITALOPRAM OXALATE 10 MG: 10 TABLET ORAL at 09:06

## 2020-06-04 RX ADMIN — ENOXAPARIN SODIUM 40 MG: 100 INJECTION SUBCUTANEOUS at 04:06

## 2020-06-04 RX ADMIN — OXYCODONE HYDROCHLORIDE 5 MG: 5 TABLET ORAL at 09:06

## 2020-06-04 RX ADMIN — METRONIDAZOLE 500 MG: 500 INJECTION, SOLUTION INTRAVENOUS at 01:06

## 2020-06-04 RX ADMIN — OXYCODONE HYDROCHLORIDE 10 MG: 5 TABLET ORAL at 09:06

## 2020-06-04 RX ADMIN — DOCUSATE SODIUM 50 MG AND SENNOSIDES 8.6 MG 1 TABLET: 8.6; 5 TABLET, FILM COATED ORAL at 09:06

## 2020-06-04 RX ADMIN — METHOCARBAMOL 500 MG: 1000 INJECTION, SOLUTION INTRAMUSCULAR; INTRAVENOUS at 05:06

## 2020-06-04 RX ADMIN — MORPHINE SULFATE 2 MG: 2 INJECTION, SOLUTION INTRAMUSCULAR; INTRAVENOUS at 03:06

## 2020-06-04 RX ADMIN — CIPROFLOXACIN 400 MG: 2 INJECTION, SOLUTION INTRAVENOUS at 01:06

## 2020-06-04 RX ADMIN — POLYETHYLENE GLYCOL 3350 17 G: 17 POWDER, FOR SOLUTION ORAL at 09:06

## 2020-06-04 RX ADMIN — OXYCODONE HYDROCHLORIDE 10 MG: 5 TABLET ORAL at 03:06

## 2020-06-04 RX ADMIN — DIPHENHYDRAMINE HYDROCHLORIDE 25 MG: 50 INJECTION, SOLUTION INTRAMUSCULAR; INTRAVENOUS at 09:06

## 2020-06-04 RX ADMIN — MICAFUNGIN SODIUM 100 MG: 20 INJECTION, POWDER, LYOPHILIZED, FOR SOLUTION INTRAVENOUS at 12:06

## 2020-06-04 RX ADMIN — METRONIDAZOLE 500 MG: 500 INJECTION, SOLUTION INTRAVENOUS at 09:06

## 2020-06-04 RX ADMIN — IRON SUCROSE 100 MG: 20 INJECTION, SOLUTION INTRAVENOUS at 09:06

## 2020-06-04 NOTE — NURSING
Spoke with Dr. Amanda MD asked about pt's condition with NG tube.  Pt has been tolerating clear liquid diet well, and hasn't had any complaint of nausea and vomiting during the day and until now.  Nurse informed that NG tube residual checked with the evening medication, upto 20cc with PH 5 noted.  Pt stated tenderness present on abdomen.  MD stated pt has no nausea/vomiting, okay to discontinue NG tube tonight, and continue to monitor.

## 2020-06-04 NOTE — PLAN OF CARE
VN rounds: VN cued into pt's room with pt's permission. Pt currently CEC. Pt resting in bed. VN instructed pt to call for assistance. Pt aware and agreeable. Allowed time for questions. Denies pain. NAD noted. Will cont to be available as needed.

## 2020-06-04 NOTE — PT/OT/SLP EVAL
Physical Therapy Evaluation and Discharge Note    Patient Name:  Zandra Eckert   MRN:  [id number]    Recommendations:     Discharge Recommendations:  home   Discharge Equipment Recommendations: none   Barriers to discharge: None    Assessment:     Zandra Eckert is a 39 y.o. female admitted with a medical diagnosis of Acute gastric ulcer with perforation. .  At this time, patient is functioning at their prior level of function and does not require further acute PT services.     Recent Surgery: Procedure(s) (LRB):  LAPAROTOMY, EXPLORATORY (N/A)  SPLENECTOMY (N/A)  GASTRECTOMY 5 Days Post-Op    Plan:     During this hospitalization, patient does not require further acute PT services.  Please re-consult if situation changes.      Subjective     Chief Complaint: pain  Patient/Family Comments/goals: go home  Pain/Comfort:  · Pain Rating 1: other (see comments)(did not rate on scale)  · Location - Side 1: Bilateral  · Location 1: abdomen  · Pain Addressed 1: Reposition, Distraction, Pre-medicate for activity    Patients cultural, spiritual, Zoroastrianism conflicts given the current situation:      Living Environment:  Will be staying with family upon completion of inpatient stay  Prior to admission, patients level of function was independent.  Equipment used at home: none.  DME owned (not currently used): none.  Upon discharge, patient will have assistance from family.    Objective:     Communicated with primary nurse prior to session.  Patient found HOB elevated with peripheral IV, bed alarm upon PT entry to room.    General Precautions: Standard, fall   Orthopedic Precautions:N/A   Braces: N/A     Exams:  · RLE ROM: WFL  · RLE Strength: WFL  · LLE ROM: WFL  · LLE Strength: WFL    Functional Mobility:  · Bed Mobility:     · Supine to Sit: modified independence  · Transfers:     · Sit to Stand:  modified independence and supervision with no AD  · Gait: 25 ft without AD supervision for safety   · Balance: good-    AM-PAC  6 CLICK MOBILITY  Total Score:23       Therapeutic Activities and Exercises:   Reviewed AROM ex seated in chair      AM-PAC 6 CLICK MOBILITY  Total Score:23     Patient left up in chair with all lines intact, call button in reach and sitter  present.    GOALS:   Multidisciplinary Problems     Physical Therapy Goals     Not on file          Multidisciplinary Problems (Resolved)        Problem: Physical Therapy Goal    Goal Priority Disciplines Outcome Goal Variances Interventions   Physical Therapy Goal   (Resolved)     PT, PT/OT Met     Description:  Goals to be met by: 2020     No PT goals established                          History:     Past Medical History:   Diagnosis Date    Anxiety     Cervical vertebral fusion     Depression        Past Surgical History:   Procedure Laterality Date    CARPAL TUNNEL RELEASE      left and right wrists    CERVICAL FUSION  2016     SECTION, CLASSIC      GASTRECTOMY  2020    Procedure: GASTRECTOMY;  Surgeon: Cristiano Alva MD;  Location: [facility] OR;  Service: General;;    INTRAUTERINE DEVICE INSERTION      SPLENECTOMY N/A 2020    Procedure: SPLENECTOMY;  Surgeon: Cristiano Alva MD;  Location: [facility] OR;  Service: General;  Laterality: N/A;       Time Tracking:     PT Received On: 20  PT Start Time: 1125     PT Stop Time: 1143  PT Total Time (min): 18 min     Billable Minutes: Evaluation 18      Samuel Rojas, PT  2020

## 2020-06-04 NOTE — PLAN OF CARE
POC reviewed with the pt, verbalized understanding.  VSS.  AAOx3.  Complaint of pain on abdomen, prn oxycodone given.  No other distress or any other complaint of pain throughout night.  PIV remained intact.  Infusing D5 1/2NS with KCL 20mEq @ 75mL/hr.  Blood glucose monitored, no insulin needed.  NG tube was clamped in the evening, MD ordered to remove it, removed around midnight, no N/V noted overnight.  No BM at night.  Sitter at the bedside.  IV antibiotics given.  Dressing on abdomen remained dry/clean/intact.  Safety maintained, free of falls throughout shift.  Instructed to call for any assistance.  Will continue to monitor.

## 2020-06-04 NOTE — PLAN OF CARE
Problem: Physical Therapy Goal  Goal: Physical Therapy Goal  Description  Goals to be met by: 6/4/2020     No PT goals established         Outcome: Met   Recommend Home   Patient Mod I with gait and transitional movementrs  No acute skilled PT needs , no apparent DME needs  Will DC PT service at this time

## 2020-06-04 NOTE — PLAN OF CARE
VIRTUAL NURSE:  Cued into patient's room.  Permission received per patient to turn camera to view patient. Pt resting in bed at this time. Sitter at bedside.  Introduced as VN for night shift that will be working with floor nurse and nursing assistant.  Educated patient on VN's role in patient care. Plan of care reviewed with patient. Education per flowsheet.  Opportunity given for questions and questions answered.  Instructed to call for assistance.  Will cont to monitor.    Labs, notes, and orders reviewed.

## 2020-06-04 NOTE — PLAN OF CARE
Rounded on patient. Patient currently PEC/CEC  Patient sleeping at this time  Sitter at bedside    Spoke to patient's nurse Marjorie, RN and she will ask MD to order PT and OT    Per Dr. Stevenson's notes - patient will dc to IP psych facilty when medically ready    Saw Dr. LUIS MIGUEL Magana when rounding- should be medically ready tomorrow.       06/04/20 1026   Discharge Reassessment   Assessment Type Discharge Planning Reassessment   Discharge Plan A Psychiatric hospital   Anticipated Discharge Disposition Psych     Jordyn Chapa RN, [facility], CMSRN  RN Transition Navigator  [phone]
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
## 2020-06-05 VITALS
HEIGHT: 60 IN | SYSTOLIC BLOOD PRESSURE: 131 MMHG | BODY MASS INDEX: 25.93 KG/M2 | OXYGEN SATURATION: 97 % | TEMPERATURE: 97 F | WEIGHT: 132.06 LBS | RESPIRATION RATE: 17 BRPM | HEART RATE: 72 BPM | DIASTOLIC BLOOD PRESSURE: 81 MMHG

## 2020-06-05 PROBLEM — K25.1 ACUTE GASTRIC ULCER WITH PERFORATION: Status: RESOLVED | Noted: 2020-05-30 | Resolved: 2020-06-05

## 2020-06-05 PROBLEM — R16.1 SPLENIC MASS: Status: RESOLVED | Noted: 2020-05-30 | Resolved: 2020-06-05

## 2020-06-05 PROBLEM — K66.8 INTRA-ABDOMINAL FREE AIR OF UNKNOWN ETIOLOGY: Status: RESOLVED | Noted: 2020-05-30 | Resolved: 2020-06-05

## 2020-06-05 LAB
ALBUMIN SERPL BCP-MCNC: 2 G/DL (ref 3.5–5.2)
ALP SERPL-CCNC: 101 U/L (ref 55–135)
ALT SERPL W/O P-5'-P-CCNC: 16 U/L (ref 10–44)
ANION GAP SERPL CALC-SCNC: 8 MMOL/L (ref 8–16)
AST SERPL-CCNC: 15 U/L (ref 10–40)
BASOPHILS # BLD AUTO: 0.04 K/UL (ref 0–0.2)
BASOPHILS NFR BLD: 0.2 % (ref 0–1.9)
BILIRUB SERPL-MCNC: 0.3 MG/DL (ref 0.1–1)
BUN SERPL-MCNC: 5 MG/DL (ref 6–20)
CALCIUM SERPL-MCNC: 8.6 MG/DL (ref 8.7–10.5)
CHLORIDE SERPL-SCNC: 100 MMOL/L (ref 95–110)
CO2 SERPL-SCNC: 29 MMOL/L (ref 23–29)
CREAT SERPL-MCNC: 0.7 MG/DL (ref 0.5–1.4)
DIFFERENTIAL METHOD: ABNORMAL
EOSINOPHIL # BLD AUTO: 0.5 K/UL (ref 0–0.5)
EOSINOPHIL NFR BLD: 3.1 % (ref 0–8)
ERYTHROCYTE [DISTWIDTH] IN BLOOD BY AUTOMATED COUNT: 13.3 % (ref 11.5–14.5)
EST. GFR  (AFRICAN AMERICAN): >60 ML/MIN/1.73 M^2
EST. GFR  (NON AFRICAN AMERICAN): >60 ML/MIN/1.73 M^2
FINAL PATHOLOGIC DIAGNOSIS: NORMAL
GLUCOSE SERPL-MCNC: 95 MG/DL (ref 70–110)
GROSS: NORMAL
HCT VFR BLD AUTO: 33.8 % (ref 37–48.5)
HGB BLD-MCNC: 11.2 G/DL (ref 12–16)
IMM GRANULOCYTES # BLD AUTO: 0.25 K/UL (ref 0–0.04)
IMM GRANULOCYTES NFR BLD AUTO: 1.5 % (ref 0–0.5)
LYMPHOCYTES # BLD AUTO: 2.3 K/UL (ref 1–4.8)
LYMPHOCYTES NFR BLD: 13.9 % (ref 18–48)
MAGNESIUM SERPL-MCNC: 1.7 MG/DL (ref 1.6–2.6)
MCH RBC QN AUTO: 30.9 PG (ref 27–31)
MCHC RBC AUTO-ENTMCNC: 33.1 G/DL (ref 32–36)
MCV RBC AUTO: 93 FL (ref 82–98)
MONOCYTES # BLD AUTO: 1.6 K/UL (ref 0.3–1)
MONOCYTES NFR BLD: 9.6 % (ref 4–15)
NEUTROPHILS # BLD AUTO: 12 K/UL (ref 1.8–7.7)
NEUTROPHILS NFR BLD: 71.7 % (ref 38–73)
NRBC BLD-RTO: 0 /100 WBC
PHOSPHATE SERPL-MCNC: 3.4 MG/DL (ref 2.7–4.5)
PLATELET # BLD AUTO: 572 K/UL (ref 150–350)
PMV BLD AUTO: 9 FL (ref 9.2–12.9)
POCT GLUCOSE: 109 MG/DL (ref 70–110)
POCT GLUCOSE: 112 MG/DL (ref 70–110)
POTASSIUM SERPL-SCNC: 3.7 MMOL/L (ref 3.5–5.1)
PROT SERPL-MCNC: 6.1 G/DL (ref 6–8.4)
RBC # BLD AUTO: 3.63 M/UL (ref 4–5.4)
SODIUM SERPL-SCNC: 137 MMOL/L (ref 136–145)
WBC # BLD AUTO: 16.73 K/UL (ref 3.9–12.7)

## 2020-06-05 PROCEDURE — 80053 COMPREHEN METABOLIC PANEL: CPT

## 2020-06-05 PROCEDURE — 36415 COLL VENOUS BLD VENIPUNCTURE: CPT

## 2020-06-05 PROCEDURE — 25000003 PHARM REV CODE 250: Performed by: STUDENT IN AN ORGANIZED HEALTH CARE EDUCATION/TRAINING PROGRAM

## 2020-06-05 PROCEDURE — 63600175 PHARM REV CODE 636 W HCPCS: Performed by: STUDENT IN AN ORGANIZED HEALTH CARE EDUCATION/TRAINING PROGRAM

## 2020-06-05 PROCEDURE — 90648 HIB PRP-T VACCINE 4 DOSE IM: CPT | Performed by: STUDENT IN AN ORGANIZED HEALTH CARE EDUCATION/TRAINING PROGRAM

## 2020-06-05 PROCEDURE — 63600175 PHARM REV CODE 636 W HCPCS: Performed by: SURGERY

## 2020-06-05 PROCEDURE — 25000003 PHARM REV CODE 250: Performed by: SURGERY

## 2020-06-05 PROCEDURE — 90471 IMMUNIZATION ADMIN: CPT | Performed by: STUDENT IN AN ORGANIZED HEALTH CARE EDUCATION/TRAINING PROGRAM

## 2020-06-05 PROCEDURE — 83735 ASSAY OF MAGNESIUM: CPT

## 2020-06-05 PROCEDURE — 84100 ASSAY OF PHOSPHORUS: CPT

## 2020-06-05 PROCEDURE — 85025 COMPLETE CBC W/AUTO DIFF WBC: CPT

## 2020-06-05 RX ORDER — OXYCODONE AND ACETAMINOPHEN 5; 325 MG/1; MG/1
1 TABLET ORAL EVERY 6 HOURS PRN
Qty: 20 TABLET | Refills: 0 | Status: SHIPPED | OUTPATIENT
Start: 2020-06-05

## 2020-06-05 RX ORDER — PANTOPRAZOLE SODIUM 40 MG/1
40 TABLET, DELAYED RELEASE ORAL DAILY
Qty: 30 TABLET | Refills: 1 | Status: SHIPPED | OUTPATIENT
Start: 2020-06-05 | End: 2020-08-13 | Stop reason: SDUPTHER

## 2020-06-05 RX ORDER — VORICONAZOLE 200 MG/1
200 TABLET, FILM COATED ORAL 2 TIMES DAILY
Qty: 10 TABLET | Refills: 0 | Status: SHIPPED | OUTPATIENT
Start: 2020-06-05 | End: 2020-06-10

## 2020-06-05 RX ORDER — SYRING-NEEDL,DISP,INSUL,0.3 ML 29 G X1/2"
296 SYRINGE, EMPTY DISPOSABLE MISCELLANEOUS ONCE
Status: DISCONTINUED | OUTPATIENT
Start: 2020-06-05 | End: 2020-06-05 | Stop reason: HOSPADM

## 2020-06-05 RX ORDER — AMOXICILLIN 250 MG
1 CAPSULE ORAL 2 TIMES DAILY
Qty: 30 TABLET | Refills: 0 | Status: SHIPPED | OUTPATIENT
Start: 2020-06-05

## 2020-06-05 RX ORDER — PANTOPRAZOLE SODIUM 40 MG/1
40 TABLET, DELAYED RELEASE ORAL DAILY
Status: DISCONTINUED | OUTPATIENT
Start: 2020-06-05 | End: 2020-06-05 | Stop reason: HOSPADM

## 2020-06-05 RX ADMIN — HAEMOPHILUS B POLYSACCHARIDE CONJUGATE VACCINE FOR INJ 0.5 ML: RECON SOLN at 01:06

## 2020-06-05 RX ADMIN — DOCUSATE SODIUM 50 MG AND SENNOSIDES 8.6 MG 1 TABLET: 8.6; 5 TABLET, FILM COATED ORAL at 09:06

## 2020-06-05 RX ADMIN — POLYETHYLENE GLYCOL 3350 17 G: 17 POWDER, FOR SOLUTION ORAL at 09:06

## 2020-06-05 RX ADMIN — PANTOPRAZOLE SODIUM 40 MG: 40 TABLET, DELAYED RELEASE ORAL at 09:06

## 2020-06-05 RX ADMIN — METHOCARBAMOL 500 MG: 1000 INJECTION, SOLUTION INTRAMUSCULAR; INTRAVENOUS at 05:06

## 2020-06-05 RX ADMIN — MICAFUNGIN SODIUM 100 MG: 20 INJECTION, POWDER, LYOPHILIZED, FOR SOLUTION INTRAVENOUS at 09:06

## 2020-06-05 RX ADMIN — IRON SUCROSE 100 MG: 20 INJECTION, SOLUTION INTRAVENOUS at 09:06

## 2020-06-05 RX ADMIN — ESCITALOPRAM OXALATE 10 MG: 10 TABLET ORAL at 09:06

## 2020-06-05 RX ADMIN — OXYCODONE HYDROCHLORIDE 10 MG: 5 TABLET ORAL at 09:06

## 2020-06-05 RX ADMIN — OXYCODONE HYDROCHLORIDE 10 MG: 5 TABLET ORAL at 04:06

## 2020-06-05 RX ADMIN — PREGABALIN 75 MG: 75 CAPSULE ORAL at 09:06

## 2020-06-05 NOTE — PLAN OF CARE
Plan for d/c to inpatient psych facility today pending bed once d/c order and Facility Transfer Orders complete.   Will notify [facility] once complete by placing ADT22 order per protocol.  CM will discuss with pt.    Abby Lindsay RN    [phone]

## 2020-06-05 NOTE — PLAN OF CARE
ADT order placed for central psych placement as pt is medically stable for d/c.      Abby Lindsay, RN    [phone]

## 2020-06-05 NOTE — PLAN OF CARE
[facility]    FACILITY TRANSFER ORDERS      Patient Name: Zandra Eckert  YOB: 1980    PCP: Zaid Garcia MD   PCP Address: [address] [place] [address] [address] / [address] [place]  PCP Phone Number: [phone]  PCP Fax: [phone]    Encounter Date: 06/05/2020    Admit to: Inpatient psych facility (undetermined)    Vital Signs:  Routine    Diagnoses:   Active Hospital Problems   No active problems to display.      Resolved Hospital Problems    Diagnosis Date Resolved POA    *Acute gastric ulcer with perforation [K25.1] 06/05/2020 Yes    Intra-abdominal free air of unknown etiology [K66.8] 06/05/2020 Yes    Splenic mass [R16.1] 06/05/2020 Yes       Allergies:  Review of patient's allergies indicates:   Allergen Reactions    Pcn [penicillins] Hives    Sulfa (sulfonamide antibiotics) Other (See Comments)     Pt reports she felt really weak       Diet: GI soft diet, 6 small meals daily, avoid large meals    Activities: Activity as tolerated; no lifting > 15 lbs    Nursing: Vital signs per unit routing, encourage incentive spirometry     Labs: not needed      CONSULTS:    Physical Therapy to evaluate and treat.     MISCELLANEOUS CARE:  none    WOUND CARE ORDERS  Yes: Surgical Wound:  Location: mid abdomen. No dressing needed. OK to shower. Staples will be removed at clinic follow up (2 weeks). If patient remains at psych facility at that time, they may be removed 5/15.      Medications: Review discharge medications with patient and family and provide education.      Current Discharge Medication List      START taking these medications    Details   oxyCODONE-acetaminophen (PERCOCET) 5-325 mg per tablet Take 1 tablet by mouth every 6 (six) hours as needed for Pain.  Qty: 20 tablet, Refills: 0    Comments: Quantity prescribed more than 7 day supply? No      pantoprazole (PROTONIX) 40 MG tablet Take 1 tablet (40 mg total) by mouth once daily.  Qty: 30 tablet, Refills: 1       senna-docusate 8.6-50 mg (PERICOLACE) 8.6-50 mg per tablet Take 1 tablet by mouth 2 (two) times daily.  Qty: 30 tablet, Refills: 0      voriconazole (VFEND) 200 MG Tab Take 1 tablet (200 mg total) by mouth 2 (two) times daily. for 5 days  Qty: 10 tablet, Refills: 0         CONTINUE these medications which have NOT CHANGED    Details   escitalopram oxalate (LEXAPRO) 10 MG tablet Take 10 mg by mouth once daily.         STOP taking these medications       tamsulosin (FLOMAX) 0.4 mg Cp24 Comments:   Reason for Stopping:         tramadol-acetaminophen 37.5-325 mg (ULTRACET) 37.5-325 mg Tab Comments:   Reason for Stopping:                  Pearl Eaton MD   [employer] General Surgery PGY2

## 2020-06-05 NOTE — PLAN OF CARE
CM has been contacted by [facility] and [facility] to clarify referral and answer questions.  Will f/u with central psych with decision.    Abby Lindsay RN    [phone]
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
## 2020-06-05 NOTE — NURSING
Pt discharged to [facility]. IV removed. Report called to [facility] here to get her. Packet sent with patient and patient belongings went to [facility] with patient.

## 2020-06-05 NOTE — PT/OT/SLP PROGRESS
"Occupational Therapy  D/c OT Orders     Patient Name:  Zandra Eckert   MRN:  [id number]    Patient declining participation in therapy services this AM 2/2 "sweating". Per nursing, pt has been ambulating in room; performed all ADLs this AM without assist. Pt currently reporting no concerns over abilities to perform self care and/or functional mobility. Pt only reports soreness to abdomen.     Pt moving independently in bed this AM and repositioning self without assist. Pt educated on adaptive LBD to prevent pain as well as log rolling for sup<>sit transitions to decrease abdominal discomfort.     Pt declining further need for OT services. D/c OT     Annalee Orlando OT  6/5/2020  "

## 2020-06-05 NOTE — PLAN OF CARE
POC reviewed with the pt, verbalized understanding.  VSS.  AAOx3.  Complaint of pain on abdomen, prn oxycodone given.  No other distress or any other complaint of pain throughout night.  PIV remained intact.  Blood glucose monitored, no insulin needed.  No N/V noted overnight.  No BM at night, scheduled colace/mirlax started.  Sitter at the bedside.  Incision abdomen remained dry/clean/intact.  Safety maintained, free of falls throughout shift.  Instructed to call for any assistance.  Will continue to monitor.

## 2020-06-05 NOTE — PROGRESS NOTES
Pharmacist Intervention IV to PO Note    Zandra Eckert is a 39 y.o. female being treated with IV medication pantoprazole    Patient Data:    Vital Signs (Most Recent):  Temp: 96.5 °F (35.8 °C) (06/05/20 0725)  Pulse: 82 (06/05/20 0725)  Resp: 17 (06/05/20 0725)  BP: (!) 152/89 (06/05/20 0725)  SpO2: 98 % (06/05/20 0312)   Vital Signs (72h Range):  Temp:  [96.3 °F (35.7 °C)-99.7 °F (37.6 °C)]   Pulse:  [70-94]   Resp:  [15-20]   BP: (109-156)/(59-99)   SpO2:  [94 %-98 %]      CBC:  Recent Labs   Lab 06/03/20  0511 06/04/20  0539 06/05/20  0641   WBC 14.64* 15.58* 16.73*   RBC 3.33* 2.83* 3.63*   HGB 10.1* 8.8* 11.2*   HCT 30.1* 26.4* 33.8*   PLT SEE COMMENT 416* 572*   MCV 90 93 93   MCH 30.3 31.1* 30.9   MCHC 33.6 33.3 33.1     CMP:     Recent Labs   Lab 06/03/20  0511 06/04/20  0713 06/05/20  0641    394* 95   CALCIUM 8.3* 7.9* 8.6*   ALBUMIN 1.9* 1.8* 2.0*   PROT 5.6* 5.4* 6.1    132* 137   K 3.3* 5.5* 3.7   CO2 30* 28 29    100 100   BUN 4* 4* 5*   CREATININE 0.7 0.8 0.7   ALKPHOS 113 80 101   ALT 28 20 16   AST 24 14 15   BILITOT 0.5 0.3 0.3       Dietary Orders:  Diet Orders            Diet Adult Regular (IDDSI Level 7) [facility]; Dysphagia (Soft); PEC/CEC - Styrofoam: Regular starting at 06/04 1400            Based on the following criteria, this patient qualifies for intravenous to oral conversion:  [x] The patients gastrointestinal tract is functioning (tolerating medications via oral or enteral route for 24 hours and tolerating food or enteral feeds for 24 hours).  [x] The patient is hemodynamically stable for 24 hours (heart rate <100 beats per minute, systolic blood pressure >99 mm Hg, and respiratory rate <20 breaths per minute).  [x] The patient shows clinical improvement (afebrile for at least 24 hours and white blood cell count downtrending or normalized). Additionally, the patient must be non-neutropenic (absolute neutrophil count >500 cells/mm3).  [x] For  antimicrobials, the patient has received IV therapy for at least 24 hours.    IV medication pantoprazole will be changed to oral medication pantoprazole    Pharmacist's Name: Pravin Schmid  Pharmacist's Extension: [phone]

## 2020-06-18 ENCOUNTER — HOSPITAL ENCOUNTER (OUTPATIENT)
Facility: HOSPITAL | Age: 40
Discharge: HOME | End: 2020-06-20
Attending: SURGERY | Admitting: SURGERY
Payer: MEDICAID

## 2020-06-18 DIAGNOSIS — R10.9 ABDOMINAL PAIN: ICD-10-CM

## 2020-06-18 DIAGNOSIS — K92.1 MELENA: Primary | ICD-10-CM

## 2020-06-18 DIAGNOSIS — R10.10 PAIN OF UPPER ABDOMEN: ICD-10-CM

## 2020-06-18 LAB
BILIRUB UR QL STRIP: NEGATIVE
CLARITY UR: CLEAR
COLOR UR: YELLOW
ESTIMATED AVG GLUCOSE: 103 MG/DL (ref 68–131)
GLUCOSE UR QL STRIP: NEGATIVE
GRAM STN SPEC: NORMAL
GRAM STN SPEC: NORMAL
HBA1C MFR BLD HPLC: 5.2 % (ref 4–5.6)
HGB UR QL STRIP: ABNORMAL
KETONES UR QL STRIP: NEGATIVE
LEUKOCYTE ESTERASE UR QL STRIP: NEGATIVE
MAGNESIUM SERPL-MCNC: 1.9 MG/DL (ref 1.6–2.6)
MICROSCOPIC COMMENT: ABNORMAL
NITRITE UR QL STRIP: NEGATIVE
PH UR STRIP: 7 [PH] (ref 5–8)
PHOSPHATE SERPL-MCNC: 2.9 MG/DL (ref 2.7–4.5)
PROT UR QL STRIP: NEGATIVE
RBC #/AREA URNS HPF: 5 /HPF (ref 0–4)
SARS-COV-2 RDRP RESP QL NAA+PROBE: NEGATIVE
SP GR UR STRIP: 1.02 (ref 1–1.03)
TSH SERPL DL<=0.005 MIU/L-ACNC: 1.51 UIU/ML (ref 0.4–4)
URN SPEC COLLECT METH UR: ABNORMAL
UROBILINOGEN UR STRIP-ACNC: NEGATIVE EU/DL

## 2020-06-18 PROCEDURE — 87205 SMEAR GRAM STAIN: CPT

## 2020-06-18 PROCEDURE — 87077 CULTURE AEROBIC IDENTIFY: CPT

## 2020-06-18 PROCEDURE — 97161 PT EVAL LOW COMPLEX 20 MIN: CPT

## 2020-06-18 PROCEDURE — G0378 HOSPITAL OBSERVATION PER HR: HCPCS

## 2020-06-18 PROCEDURE — 87075 CULTR BACTERIA EXCEPT BLOOD: CPT

## 2020-06-18 PROCEDURE — 25000003 PHARM REV CODE 250: Performed by: STUDENT IN AN ORGANIZED HEALTH CARE EDUCATION/TRAINING PROGRAM

## 2020-06-18 PROCEDURE — 87186 SC STD MICRODIL/AGAR DIL: CPT

## 2020-06-18 PROCEDURE — 99285 EMERGENCY DEPT VISIT HI MDM: CPT | Mod: 25

## 2020-06-18 PROCEDURE — U0002 COVID-19 LAB TEST NON-CDC: HCPCS

## 2020-06-18 PROCEDURE — 87070 CULTURE OTHR SPECIMN AEROBIC: CPT

## 2020-06-18 PROCEDURE — 97165 OT EVAL LOW COMPLEX 30 MIN: CPT

## 2020-06-18 PROCEDURE — 87106 FUNGI IDENTIFICATION YEAST: CPT

## 2020-06-18 PROCEDURE — 87102 FUNGUS ISOLATION CULTURE: CPT

## 2020-06-18 PROCEDURE — 83735 ASSAY OF MAGNESIUM: CPT

## 2020-06-18 PROCEDURE — 84100 ASSAY OF PHOSPHORUS: CPT

## 2020-06-18 PROCEDURE — 83036 HEMOGLOBIN GLYCOSYLATED A1C: CPT

## 2020-06-18 PROCEDURE — 81000 URINALYSIS NONAUTO W/SCOPE: CPT

## 2020-06-18 PROCEDURE — 36415 COLL VENOUS BLD VENIPUNCTURE: CPT

## 2020-06-18 PROCEDURE — 84443 ASSAY THYROID STIM HORMONE: CPT

## 2020-06-18 RX ORDER — DEXTROSE 50 % IN WATER (D50W) INTRAVENOUS SYRINGE
25
Status: DISCONTINUED | OUTPATIENT
Start: 2020-06-18 | End: 2020-06-20 | Stop reason: HOSPADM

## 2020-06-18 RX ORDER — DEXTROSE 50 % IN WATER (D50W) INTRAVENOUS SYRINGE
12.5
Status: DISCONTINUED | OUTPATIENT
Start: 2020-06-18 | End: 2020-06-20 | Stop reason: HOSPADM

## 2020-06-18 RX ORDER — SODIUM CHLORIDE 0.9 % (FLUSH) 0.9 %
5 SYRINGE (ML) INJECTION
Status: DISCONTINUED | OUTPATIENT
Start: 2020-06-18 | End: 2020-06-20 | Stop reason: HOSPADM

## 2020-06-18 RX ORDER — PANTOPRAZOLE SODIUM 40 MG/1
40 TABLET, DELAYED RELEASE ORAL 2 TIMES DAILY
Status: DISCONTINUED | OUTPATIENT
Start: 2020-06-18 | End: 2020-06-20 | Stop reason: HOSPADM

## 2020-06-18 RX ORDER — AMOXICILLIN 250 MG
1 CAPSULE ORAL 2 TIMES DAILY
Status: DISCONTINUED | OUTPATIENT
Start: 2020-06-18 | End: 2020-06-20 | Stop reason: HOSPADM

## 2020-06-18 RX ORDER — PANTOPRAZOLE SODIUM 40 MG/1
40 TABLET, DELAYED RELEASE ORAL DAILY
Status: DISCONTINUED | OUTPATIENT
Start: 2020-06-18 | End: 2020-06-18 | Stop reason: SDUPTHER

## 2020-06-18 RX ORDER — ACETAMINOPHEN 325 MG/1
650 TABLET ORAL EVERY 4 HOURS PRN
Status: DISCONTINUED | OUTPATIENT
Start: 2020-06-18 | End: 2020-06-20 | Stop reason: HOSPADM

## 2020-06-18 RX ORDER — OXYCODONE HYDROCHLORIDE 5 MG/1
5 TABLET ORAL EVERY 6 HOURS PRN
Status: DISCONTINUED | OUTPATIENT
Start: 2020-06-18 | End: 2020-06-20 | Stop reason: HOSPADM

## 2020-06-18 RX ORDER — ONDANSETRON 8 MG/1
8 TABLET, ORALLY DISINTEGRATING ORAL EVERY 6 HOURS PRN
Status: DISCONTINUED | OUTPATIENT
Start: 2020-06-18 | End: 2020-06-20 | Stop reason: HOSPADM

## 2020-06-18 RX ORDER — IBUPROFEN 200 MG
24 TABLET ORAL
Status: DISCONTINUED | OUTPATIENT
Start: 2020-06-18 | End: 2020-06-20 | Stop reason: HOSPADM

## 2020-06-18 RX ORDER — TALC
9 POWDER (GRAM) TOPICAL NIGHTLY PRN
Status: DISCONTINUED | OUTPATIENT
Start: 2020-06-18 | End: 2020-06-20 | Stop reason: HOSPADM

## 2020-06-18 RX ORDER — GLUCAGON 1 MG
1 KIT INJECTION
Status: DISCONTINUED | OUTPATIENT
Start: 2020-06-18 | End: 2020-06-20 | Stop reason: HOSPADM

## 2020-06-18 RX ORDER — ESCITALOPRAM OXALATE 10 MG/1
10 TABLET ORAL DAILY
Status: DISCONTINUED | OUTPATIENT
Start: 2020-06-18 | End: 2020-06-20 | Stop reason: HOSPADM

## 2020-06-18 RX ORDER — IBUPROFEN 200 MG
16 TABLET ORAL
Status: DISCONTINUED | OUTPATIENT
Start: 2020-06-18 | End: 2020-06-20 | Stop reason: HOSPADM

## 2020-06-18 RX ORDER — ACETAMINOPHEN 325 MG/1
650 TABLET ORAL EVERY 8 HOURS PRN
Status: DISCONTINUED | OUTPATIENT
Start: 2020-06-18 | End: 2020-06-20 | Stop reason: HOSPADM

## 2020-06-18 RX ADMIN — ESCITALOPRAM OXALATE 10 MG: 10 TABLET ORAL at 01:06

## 2020-06-18 RX ADMIN — STANDARDIZED SENNA CONCENTRATE AND DOCUSATE SODIUM 1 TABLET: 8.6; 5 TABLET ORAL at 09:06

## 2020-06-18 RX ADMIN — OXYCODONE HYDROCHLORIDE 5 MG: 5 TABLET ORAL at 01:06

## 2020-06-18 RX ADMIN — PANTOPRAZOLE SODIUM 40 MG: 40 TABLET, DELAYED RELEASE ORAL at 09:06

## 2020-06-18 RX ADMIN — OXYCODONE HYDROCHLORIDE 5 MG: 5 TABLET ORAL at 09:06

## 2020-06-18 RX ADMIN — ONDANSETRON 8 MG: 8 TABLET, ORALLY DISINTEGRATING ORAL at 05:06

## 2020-06-18 RX ADMIN — Medication 9 MG: at 09:06

## 2020-06-18 RX ADMIN — PANTOPRAZOLE SODIUM 40 MG: 40 TABLET, DELAYED RELEASE ORAL at 01:06

## 2020-06-18 RX ADMIN — STANDARDIZED SENNA CONCENTRATE AND DOCUSATE SODIUM 1 TABLET: 8.6; 5 TABLET ORAL at 01:06

## 2020-06-18 NOTE — PROCEDURES
Interventional Radiology Post-Procedure Note    Pre Op Diagnosis: Fluid collection  Post Op Diagnosis: Same    Procedure: US-guided aspiration    Procedure performed by: Ranjit    Written Informed Consent Obtained: Yes  Specimen Sent: Yes  Estimated Blood Loss: Minimal    Findings:   Small hypoechoic fluid collection. 35 mL cloudy reddish mary jane fluid removed. Specimen sent. No significant residual on post.    No immediate complications. Patient tolerated procedure well. Please see full dictated procedure report for additional details and recommendations.      Tyson Church MD  [facility]  Pager [phone]

## 2020-06-18 NOTE — PT/OT/SLP EVAL
Physical Therapy Evaluation and Discharge Note    Patient Name:  Zandra Eckert   MRN:  [id number]    Recommendations:     Discharge Recommendations:  home   Discharge Equipment Recommendations: none   Barriers to discharge: None    Assessment:     Zandra Eckert is a 39 y.o. female admitted with a medical diagnosis of The primary encounter diagnosis was Melena. Diagnoses of Abdominal pain and Pain of upper abdomen were also pertinent to this visit..  At this time, patient is functioning at their prior level of function and does not require further acute PT services.     Recent Surgery: * No surgery found *      Plan:     During this hospitalization, patient does not require further acute PT services.  Please re-consult if situation changes.      Subjective     Chief Complaint: abdominal pain  Patient/Family Comments/goals: pt's only c/o incisional abdominal pain from surgery 2-3 weeks ago  Pain/Comfort:  · Pain Rating 1: 7/10  · Location - Orientation 1: midline  · Location 1: abdomen  · Pain Addressed 1: Reposition, Distraction, Cessation of Activity  · Pain Rating Post-Intervention 1: 7/10    Patients cultural, spiritual, Rastafari conflicts given the current situation: no    Living Environment:  Pt lives with her brother in a [facility] with 5 Rebecca with B rails and tub/shower combo.  Prior to admission, patients level of function was independent without AD for mobility and ADLs, drives.  Equipment used at home: none.  DME owned (not currently used): none.  Upon discharge, patient will have assistance from family.    Objective:     Communicated with nurse Calderon prior to session.  Patient found HOB elevated with bed alarm upon PT entry to room.    General Precautions: Standard, fall   Orthopedic Precautions:N/A   Braces: N/A     Exams:  · Gross Motor Coordination:  WFL  · Postural Exam:  Patient presented with the following abnormalities:    · -       No postural abnormalities identified  · Sensation:    · -        Intact  · Skin Integrity/Edema:      · -       Skin integrity: midline abdominal incision  · RLE ROM: WFL  · RLE Strength: WFL  · LLE ROM: WFL  · LLE Strength: WFL    Functional Mobility:  · Bed Mobility:     · Scooting: independence  · Supine to Sit: independence  · Sit to Supine: independence  · Transfers:     · Sit to Stand:  independence with no AD  · Gait: >100 ft with no instability or LOB - pt reporting she is at her baseline    AM-PAC 6 CLICK MOBILITY  Total Score:24       Therapeutic Activities and Exercises:  Pt functioning at independent level with no further PT needs.  Educated pt and nurse that pt should ambulate with nursing 2-3x/day.    AM-PAC 6 CLICK MOBILITY  Total Score:24     Patient left HOB elevated with all lines intact, call button in reach, bed alarm on and nurse notified.    GOALS:   Multidisciplinary Problems     Physical Therapy Goals     Not on file          Multidisciplinary Problems (Resolved)        Problem: Physical Therapy Goal    Goal Priority Disciplines Outcome Goal Variances Interventions   Physical Therapy Goal   (Resolved)     PT, PT/OT Met     Description: PT evaluation completed, note to follow. Pt functioning independently without AD with no further PT needs. D/C PT.                     History:     Past Medical History:   Diagnosis Date    Anxiety     Cervical vertebral fusion     Depression        Past Surgical History:   Procedure Laterality Date    CARPAL TUNNEL RELEASE      left and right wrists    CERVICAL FUSION  2016     SECTION, CLASSIC      GASTRECTOMY  2020    Procedure: GASTRECTOMY;  Surgeon: Cristiano Alva MD;  Location: [facility] OR;  Service: General;;    INTRAUTERINE DEVICE INSERTION      SPLENECTOMY N/A 2020    Procedure: SPLENECTOMY;  Surgeon: Cristiano Alva MD;  Location: [facility] OR;  Service: General;  Laterality: N/A;       Time Tracking:     PT Received On: 20  PT Start Time: 1311     PT Stop Time: 1319  PT  Total Time (min): 8 min     Billable Minutes: Evaluation 8      Jenny Bryant, PT  06/18/2020

## 2020-06-18 NOTE — PROGRESS NOTES
39-year-old female recently discharged after a GI surgery returns to the emergency room for corona virus clearance to be admitted.  She is to be directly admitted.  She is here to make sure she has a negative COVID swab so she can be safely admitted to this facility.  On arrival, patient is awake, alert, and oriented with stable vital signs and has no particular complaints.  Her COVID swab is negative.  The admitting team will come down and write orders.

## 2020-06-18 NOTE — NURSING
Abscess drainage complete, removed 35mL serosanguineous fluid, sent fluid to lab, called and gave report to Patria RN on floor, pt left IR via wheelchair with transport, VS stable, care, complete

## 2020-06-18 NOTE — CONSULTS
Interventional Radiology Consult/Pre-Procedure Note      Chief Complaint/Reason for Consult: Fluid collection    History of Present Illness:  Zandra Eckert is a 39 y.o. female with the PMH listed below who presents with a fluid collection in the setting of prior lap.    Admission H&P reviewed.    Past Medical History:   Diagnosis Date    Anxiety     Cervical vertebral fusion     Depression      Past Surgical History:   Procedure Laterality Date    CARPAL TUNNEL RELEASE      left and right wrists    CERVICAL FUSION  2016     SECTION, CLASSIC      GASTRECTOMY  2020    Procedure: GASTRECTOMY;  Surgeon: Cristiano Alva MD;  Location: [facility] OR;  Service: General;;    INTRAUTERINE DEVICE INSERTION      SPLENECTOMY N/A 2020    Procedure: SPLENECTOMY;  Surgeon: Cristiano Alva MD;  Location: [facility] OR;  Service: General;  Laterality: N/A;       Allergies:   Review of patient's allergies indicates:   Allergen Reactions    Pcn [penicillins] Hives    Sulfa (sulfonamide antibiotics) Other (See Comments)     Pt reports she felt really weak       Scheduled Meds:    escitalopram oxalate  10 mg Oral Daily    pantoprazole  40 mg Oral BID    senna-docusate 8.6-50 mg  1 tablet Oral BID     Continuous Infusions:   PRN Meds:acetaminophen, acetaminophen, dextrose 50 % in water (D50W), dextrose 50 % in water (D50W), glucagon (human recombinant), glucose, glucose, melatonin, ondansetron, oxyCODONE, sodium chloride 0.9%    Anticoagulation/Antiplatelet Meds: no anticoagulation    Review of Systems:   As documented in admission H&P.    Physical Exam:  Temp: 97.5 °F (36.4 °C) (20 162)  Pulse: 74 (20 1626)  Resp: 18 (20 162)  BP: 121/63 (20 1626)  SpO2: 99 % (20 162)     General: NAD  HEENT: Normocephalic, sclera anicteric, oropharynx clear  Heart: RRR  Lungs: Symmetric excursions, breathing unlabored  Abd: ND, NT, soft, some erythema around vertical lap  staples  Extremities: BAIN   Neuro: Gross nonfocal    Labs:  No results for input(s): INR in the last 168 hours.    Invalid input(s):  PT,  PTT  No results for input(s): WBC, HGB, HCT, MCV, PLT in the last 168 hours.   Recent Labs   Lab 06/18/20  1417   MG 1.9     Imaging:  [facility] CT AP 6/18/20 reviewed.    Assessment/Plan:   Fluid collection. Likely seroma. Will aspirate today. Will send for culture. If purulent, will place drain.    Sedation: None    Risks (including, but not limited to, pain, bleeding, infection, damage to nearby structures, failure, and the need for additional procedures), benefits, and alternatives were discussed with the patient. All questions were answered to the best of my abilities. The patient wishes to proceed. Written informed consent was obtained.       Tyson Church MD  [facility]  Pager [phone]

## 2020-06-18 NOTE — PT/OT/SLP EVAL
Occupational Therapy   Evaluation and Discharge Note    Name: Zandra Eckert  MRN: [id number]  Admitting Diagnosis:  <principal problem not specified>      Recommendations:     Discharge Recommendations: home  Discharge Equipment Recommendations:  none  Barriers to discharge:  None    Assessment:     Zandra Eckert is a 39 y.o. female with a medical diagnosis of <principal problem not specified>. At this time, patient is functioning at their prior level of function and does not require further acute OT services.   Pt performing functional mobility and ADLs independently at this time. D/c OT       Plan:     During this hospitalization, patient does not require further acute OT services.  Please re-consult if situation changes.    · Plan of Care Reviewed with: patient    Subjective     Chief Complaint: none stated  Patient/Family Comments/goals: none stated     Occupational Profile:  Living Environment: with brother in [facility], 4 steps to enter, B rails, t/s combo  Previous level of function: independent; does not drive   Equipment Used at home:  none  Assistance upon Discharge: from brother     Pain/Comfort:  · Pain Rating 1: 0/10    Patients cultural, spiritual, Jain conflicts given the current situation:      Objective:     Communicated with: sraah prior to session.   General Precautions: Standard, fall   Orthopedic Precautions:N/A   Braces: N/A     Occupational Performance:    Bed Mobility:    · Patient completed Scooting/Bridging with independence  · Patient completed Supine to Sit with independence  · Patient completed Sit to Supine with independence    Functional Mobility/Transfers:  · Patient completed Sit <> Stand Transfer with independence  with  no assistive device   · Patient completed Bed <> Chair Transfer using Step Transfer technique with independence with no assistive device  · Functional Mobility: independent; no AD     Activities of Daily Living:  · Lower Body Dressing: independence       Cognitive/Visual Perceptual:  WFL     Physical Exam:  Balance:    -       WFL   WFL BUE ROM   Wound abdomen     AMPAC 6 Click ADL:  AMPAC Total Score: 24    Treatment & Education:  N/A   Education:    Patient left supine with all lines intact, call button in reach and nsg notified and MARTA system present     GOALS:   Multidisciplinary Problems     Occupational Therapy Goals     Not on file                History:     Past Medical History:   Diagnosis Date    Anxiety     Cervical vertebral fusion     Depression        Past Surgical History:   Procedure Laterality Date    CARPAL TUNNEL RELEASE      left and right wrists    CERVICAL FUSION  2016     SECTION, CLASSIC      GASTRECTOMY  2020    Procedure: GASTRECTOMY;  Surgeon: Cristiano Alva MD;  Location: [facility] OR;  Service: General;;    INTRAUTERINE DEVICE INSERTION      SPLENECTOMY N/A 2020    Procedure: SPLENECTOMY;  Surgeon: Cristiano Alva MD;  Location: [facility] OR;  Service: General;  Laterality: N/A;       Time Tracking:     OT Date of Treatment: 20  OT Start Time: 1357  OT Stop Time: 1404  OT Total Time (min): 7 min    Billable Minutes:Evaluation 7    Annalee Orlando OT  2020

## 2020-06-18 NOTE — CONSULTS
[facility]    CC: positive FOBT    HPI 39 y.o. female with less than 1-day history of single-episode of positive FOBT; ocurring in setting following recent operative course; not associated with current hematemesis, melena, or hematochezia per patient. Recent surgery with distal gastrectomy and uncut Errol-en-Y with splenectomy following a perforated gastric ulcer on 20. No prior colonoscopy. Has some residual abdominal discomfort but reports no constipation.    Chart reviewed and summarized here.    Past Medical History  Past Medical History:   Diagnosis Date    Anxiety     Cervical vertebral fusion     Depression        Past Surgical History  Past Surgical History:   Procedure Laterality Date    CARPAL TUNNEL RELEASE      left and right wrists    CERVICAL FUSION  2016     SECTION, CLASSIC      GASTRECTOMY  2020    Procedure: GASTRECTOMY;  Surgeon: Cristiano Alva MD;  Location: [facility] OR;  Service: General;;    INTRAUTERINE DEVICE INSERTION      SPLENECTOMY N/A 2020    Procedure: SPLENECTOMY;  Surgeon: Cristiano Alva MD;  Location: [facility] OR;  Service: General;  Laterality: N/A;       Social History  Social History     Tobacco Use    Smoking status: Current Every Day Smoker     Packs/day: 1.00     Years: 22.00     Pack years: 22.00     Types: Cigarettes     Start date:     Smokeless tobacco: Never Used    Tobacco comment: Ambulatory referral to Smoking Cessation program.    Substance Use Topics    Alcohol use: No    Drug use: Yes     Types: Marijuana       Family History  No established family history of colon cancer in first-degree relative    Review of Systems  General ROS: negative for chills, fever  Psychological ROS: negative for hallucination or suicidal ideation  Ophthalmic ROS: negative for blurry vision, photophobia  ENT ROS: negative for epistaxis or rhinorrhea  Respiratory ROS: no shortness of breath, or wheezing  Cardiovascular ROS: no  "chest pain or dyspnea at rest  Gastrointestinal ROS: no change in bowel habits, or black/ bloody stools  Genito-Urinary ROS: no dysuria or hematuria  Musculoskeletal ROS: negative for gait disturbance or muscular weakness  Neurological ROS: no syncope or seizures  Dermatological ROS: negative for rash and jaundice    Physical Examination  /79 (BP Location: Left arm, Patient Position: Lying)   Pulse 86   Temp 98.2 °F (36.8 °C) (Oral)   Resp 18   Ht 5' 1" (1.549 m)   Wt 60.3 kg (133 lb)   SpO2 (!) 85%   BMI 25.13 kg/m²   General appearance: alert, cooperative, no distress  HENT: Ecchymoses of L eyelid, neck symmetrical, no nasal discharge   Eyes: conjunctivae/corneas clear, PERRL, EOMI  Lungs: clear to auscultation anteriorly, no dullness to percussion bilaterally  Heart: regular rate and rhythm without rub; no palpated displacement of the PMI   Abdomen: soft, non-tender; bowel sounds present; no organomegaly  Extremities: extremities symmetric; no cyanosis or edema  Integument: Skin color, texture, turgor normal; no rashes; hair distrubution normal  Neurologic: Alert and oriented X 3, normal coordination  Psychiatric: no pressured speech; normal affect; no evidence of impaired cognition     Labs:  FOBT+  Hgb 10.2  Lab Results   Component Value Date    HGB 11.2 (L) 06/05/2020    HGB 8.8 (L) 06/04/2020    HGB 10.1 (L) 06/03/2020       Assessment:   39 y.o. female patient with recent distal gastrectomy with Errol-en-Y for perforated gastric ulcer 5/30/20. She denies any overt bleeding symptoms. FOBT was apparently ordered and positive as outpatient.    Plan:   Outpatient GI followup for routine endoscopic eval when farther away from recent surgery with no plans for inpatient EGD     "

## 2020-06-18 NOTE — PLAN OF CARE
Problem: Physical Therapy Goal  Goal: Physical Therapy Goal  Description: PT evaluation completed, note to follow. Pt functioning independently without AD with no further PT needs. D/C PT.    Outcome: Met

## 2020-06-18 NOTE — PLAN OF CARE
VN cued into pt's room to introduce VN role and complete admission process,  Pt is asleep, without signs of pain, anxiety or dyspnea.  Pt not awakening to voice, bedside nurse Patria informed.  Pt's chart, labs and vital signs reviewed, will be available to intervene if needed and return when pt more alert.

## 2020-06-18 NOTE — PLAN OF CARE
Pt AAOx3. Was NPO before IR procedure, now on a reg diet. C/o incisional pain relived by oxy IR, c/o nausea/indigestion after eating relieved by zofran. Gets up to toilet w/ standby assist. Had 1 BM today, small, dark, formed. Awaiting abscess aspiration culture results. Call light w/in reach, bed alarm on, safety maintained.

## 2020-06-19 ENCOUNTER — CLINICAL SUPPORT (OUTPATIENT)
Dept: SMOKING CESSATION | Facility: CLINIC | Age: 40
End: 2020-06-19

## 2020-06-19 DIAGNOSIS — F17.210 CIGARETTE SMOKER: Primary | ICD-10-CM

## 2020-06-19 LAB
ALBUMIN SERPL BCP-MCNC: 2.4 G/DL (ref 3.5–5.2)
ALP SERPL-CCNC: 77 U/L (ref 55–135)
ALT SERPL W/O P-5'-P-CCNC: 16 U/L (ref 10–44)
ANION GAP SERPL CALC-SCNC: 7 MMOL/L (ref 8–16)
AST SERPL-CCNC: 18 U/L (ref 10–40)
BASOPHILS # BLD AUTO: 0.05 K/UL (ref 0–0.2)
BASOPHILS # BLD AUTO: 0.07 K/UL (ref 0–0.2)
BASOPHILS NFR BLD: 0.5 % (ref 0–1.9)
BASOPHILS NFR BLD: 0.6 % (ref 0–1.9)
BILIRUB SERPL-MCNC: 0.1 MG/DL (ref 0.1–1)
BUN SERPL-MCNC: 10 MG/DL (ref 6–20)
CALCIUM SERPL-MCNC: 8.3 MG/DL (ref 8.7–10.5)
CHLORIDE SERPL-SCNC: 107 MMOL/L (ref 95–110)
CO2 SERPL-SCNC: 24 MMOL/L (ref 23–29)
CREAT SERPL-MCNC: 0.7 MG/DL (ref 0.5–1.4)
DIFFERENTIAL METHOD: ABNORMAL
DIFFERENTIAL METHOD: ABNORMAL
EOSINOPHIL # BLD AUTO: 0.3 K/UL (ref 0–0.5)
EOSINOPHIL # BLD AUTO: 0.4 K/UL (ref 0–0.5)
EOSINOPHIL NFR BLD: 3.2 % (ref 0–8)
EOSINOPHIL NFR BLD: 3.5 % (ref 0–8)
ERYTHROCYTE [DISTWIDTH] IN BLOOD BY AUTOMATED COUNT: 13.9 % (ref 11.5–14.5)
ERYTHROCYTE [DISTWIDTH] IN BLOOD BY AUTOMATED COUNT: 14.1 % (ref 11.5–14.5)
EST. GFR  (AFRICAN AMERICAN): >60 ML/MIN/1.73 M^2
EST. GFR  (NON AFRICAN AMERICAN): >60 ML/MIN/1.73 M^2
GLUCOSE SERPL-MCNC: 119 MG/DL (ref 70–110)
HCT VFR BLD AUTO: 26.8 % (ref 37–48.5)
HCT VFR BLD AUTO: 28.9 % (ref 37–48.5)
HGB BLD-MCNC: 8.8 G/DL (ref 12–16)
HGB BLD-MCNC: 9.5 G/DL (ref 12–16)
IMM GRANULOCYTES # BLD AUTO: 0.03 K/UL (ref 0–0.04)
IMM GRANULOCYTES # BLD AUTO: 0.04 K/UL (ref 0–0.04)
IMM GRANULOCYTES NFR BLD AUTO: 0.3 % (ref 0–0.5)
IMM GRANULOCYTES NFR BLD AUTO: 0.4 % (ref 0–0.5)
LYMPHOCYTES # BLD AUTO: 2.8 K/UL (ref 1–4.8)
LYMPHOCYTES # BLD AUTO: 2.9 K/UL (ref 1–4.8)
LYMPHOCYTES NFR BLD: 26.3 % (ref 18–48)
LYMPHOCYTES NFR BLD: 27.7 % (ref 18–48)
MAGNESIUM SERPL-MCNC: 1.9 MG/DL (ref 1.6–2.6)
MCH RBC QN AUTO: 30.9 PG (ref 27–31)
MCH RBC QN AUTO: 30.9 PG (ref 27–31)
MCHC RBC AUTO-ENTMCNC: 32.8 G/DL (ref 32–36)
MCHC RBC AUTO-ENTMCNC: 32.9 G/DL (ref 32–36)
MCV RBC AUTO: 94 FL (ref 82–98)
MCV RBC AUTO: 94 FL (ref 82–98)
MONOCYTES # BLD AUTO: 0.8 K/UL (ref 0.3–1)
MONOCYTES # BLD AUTO: 0.9 K/UL (ref 0.3–1)
MONOCYTES NFR BLD: 8 % (ref 4–15)
MONOCYTES NFR BLD: 8.6 % (ref 4–15)
NEUTROPHILS # BLD AUTO: 6.2 K/UL (ref 1.8–7.7)
NEUTROPHILS # BLD AUTO: 6.5 K/UL (ref 1.8–7.7)
NEUTROPHILS NFR BLD: 60.3 % (ref 38–73)
NEUTROPHILS NFR BLD: 60.6 % (ref 38–73)
NRBC BLD-RTO: 0 /100 WBC
NRBC BLD-RTO: 0 /100 WBC
PHOSPHATE SERPL-MCNC: 3.4 MG/DL (ref 2.7–4.5)
PLATELET # BLD AUTO: 650 K/UL (ref 150–350)
PLATELET # BLD AUTO: 667 K/UL (ref 150–350)
PMV BLD AUTO: 9.2 FL (ref 9.2–12.9)
PMV BLD AUTO: 9.8 FL (ref 9.2–12.9)
POTASSIUM SERPL-SCNC: 3.2 MMOL/L (ref 3.5–5.1)
PROT SERPL-MCNC: 5.8 G/DL (ref 6–8.4)
RBC # BLD AUTO: 2.85 M/UL (ref 4–5.4)
RBC # BLD AUTO: 3.07 M/UL (ref 4–5.4)
SODIUM SERPL-SCNC: 138 MMOL/L (ref 136–145)
WBC # BLD AUTO: 10.32 K/UL (ref 3.9–12.7)
WBC # BLD AUTO: 10.78 K/UL (ref 3.9–12.7)

## 2020-06-19 PROCEDURE — 25000003 PHARM REV CODE 250: Performed by: STUDENT IN AN ORGANIZED HEALTH CARE EDUCATION/TRAINING PROGRAM

## 2020-06-19 PROCEDURE — S4991 NICOTINE PATCH NONLEGEND: HCPCS | Performed by: STUDENT IN AN ORGANIZED HEALTH CARE EDUCATION/TRAINING PROGRAM

## 2020-06-19 PROCEDURE — 83735 ASSAY OF MAGNESIUM: CPT

## 2020-06-19 PROCEDURE — 84100 ASSAY OF PHOSPHORUS: CPT

## 2020-06-19 PROCEDURE — G0378 HOSPITAL OBSERVATION PER HR: HCPCS

## 2020-06-19 PROCEDURE — 85025 COMPLETE CBC W/AUTO DIFF WBC: CPT

## 2020-06-19 PROCEDURE — 99406 BEHAV CHNG SMOKING 3-10 MIN: CPT | Mod: S$GLB,,,

## 2020-06-19 PROCEDURE — 80053 COMPREHEN METABOLIC PANEL: CPT

## 2020-06-19 PROCEDURE — 36415 COLL VENOUS BLD VENIPUNCTURE: CPT

## 2020-06-19 PROCEDURE — 99406 PT REFUSED TOBACCO CESSATION: ICD-10-PCS | Mod: S$GLB,,,

## 2020-06-19 RX ORDER — POTASSIUM CHLORIDE 20 MEQ/1
40 TABLET, EXTENDED RELEASE ORAL 2 TIMES DAILY
Status: COMPLETED | OUTPATIENT
Start: 2020-06-19 | End: 2020-06-20

## 2020-06-19 RX ORDER — IBUPROFEN 200 MG
1 TABLET ORAL DAILY
Status: DISCONTINUED | OUTPATIENT
Start: 2020-06-19 | End: 2020-06-20 | Stop reason: HOSPADM

## 2020-06-19 RX ORDER — METOCLOPRAMIDE 10 MG/1
10 TABLET ORAL
Status: DISCONTINUED | OUTPATIENT
Start: 2020-06-19 | End: 2020-06-20 | Stop reason: HOSPADM

## 2020-06-19 RX ORDER — SUCRALFATE 1 G/10ML
1 SUSPENSION ORAL
Status: DISCONTINUED | OUTPATIENT
Start: 2020-06-19 | End: 2020-06-20 | Stop reason: HOSPADM

## 2020-06-19 RX ADMIN — OXYCODONE HYDROCHLORIDE 5 MG: 5 TABLET ORAL at 04:06

## 2020-06-19 RX ADMIN — SUCRALFATE 1 G: 1 SUSPENSION ORAL at 09:06

## 2020-06-19 RX ADMIN — METOCLOPRAMIDE 10 MG: 10 TABLET ORAL at 04:06

## 2020-06-19 RX ADMIN — METOCLOPRAMIDE 10 MG: 10 TABLET ORAL at 09:06

## 2020-06-19 RX ADMIN — SUCRALFATE 1 G: 1 SUSPENSION ORAL at 04:06

## 2020-06-19 RX ADMIN — STANDARDIZED SENNA CONCENTRATE AND DOCUSATE SODIUM 1 TABLET: 8.6; 5 TABLET ORAL at 09:06

## 2020-06-19 RX ADMIN — PANTOPRAZOLE SODIUM 40 MG: 40 TABLET, DELAYED RELEASE ORAL at 09:06

## 2020-06-19 RX ADMIN — OXYCODONE HYDROCHLORIDE 5 MG: 5 TABLET ORAL at 03:06

## 2020-06-19 RX ADMIN — ESCITALOPRAM OXALATE 10 MG: 10 TABLET ORAL at 09:06

## 2020-06-19 RX ADMIN — Medication 9 MG: at 09:06

## 2020-06-19 RX ADMIN — NICOTINE 1 PATCH: 14 PATCH TRANSDERMAL at 09:06

## 2020-06-19 RX ADMIN — OXYCODONE HYDROCHLORIDE 5 MG: 5 TABLET ORAL at 11:06

## 2020-06-19 RX ADMIN — POTASSIUM CHLORIDE 40 MEQ: 1500 TABLET, EXTENDED RELEASE ORAL at 11:06

## 2020-06-19 NOTE — PROGRESS NOTES
GENERAL SURGERY PROGRESS NOTE    NAEON  No further episodes of melena since arrival  Tolerating diet, increased abd pain after eating  IR drained 35 cc old hematoma from incision    Inpatient Data      Vitals:   Temp:  [97.2 °F (36.2 °C)-98.9 °F (37.2 °C)]   Pulse:  [73-92]   Resp:  [15-19]   BP: ()/(53-79)   SpO2:  [97 %-100 %]     Diet Adult Regular (IDDSI Level 7)   Intake/Output Summary (Last 24 hours) at 6/19/2020 0823  Last data filed at 6/19/2020 0534  Gross per 24 hour   Intake 750 ml   Output --   Net 750 ml          Physical Exam:  AFVSS  Gen - NAD  Chest - RRR, NWOB on RA  Abd - soft, ND, approp TTP around incision, midline incision C/D/I with no signs of infxn or break down    Laboratory:  CBC:   Recent Labs   Lab 06/19/20  0442   WBC 10.78   RBC 2.85*   HGB 8.8*   HCT 26.8*   *   MCV 94   MCH 30.9   MCHC 32.8     CMP:   Recent Labs   Lab 06/19/20  0442   *   CALCIUM 8.3*   ALBUMIN 2.4*   PROT 5.8*      K 3.2*   CO2 24      BUN 10   CREATININE 0.7   ALKPHOS 77   ALT 16   AST 18   BILITOT 0.1       ASSESSMENT/PLAN:      39 yoF s/p ex lap with antrectomy and B2 reconstruction, splenectomy with melena.    -cont BID PPI, add carafate  -GI would like to wait for endoscopy until farther out post op  -DC staples today  -continue regular diet with frequent small meals  -H/H drop to 8.5 from 10.2, will recheck tomorrow and monitor stool quality  PPx: Eliecerx    Pearl Eaton MD   [facility] PGY2

## 2020-06-19 NOTE — PROGRESS NOTES
Smoking cessation education provided. Pt denies nicotine withdrawal symptoms. Pt states that she is ready to quit smoking. Ambulatory referral to Smoking Cessation program following hospital discharge.    LMTCB with pt's female family member. Need to inform pt to schedule an appt for [facility] refill request and f/u on irregular bleeding.

## 2020-06-19 NOTE — H&P
[facility]  Consultation Note    SUBJECTIVE:     Reason for Consultation:   melena    History of Present Illness:  39 yoF recently admitted for perforated ulcer who underwent antrectomy with Billroth II reconstruction and splenectomy She was discharged to inpatient psych facility, however for the last 3 days she has noticed melanotic stools. She describes them as black and sticky. No maroon or red blood. Has never had episodes like this in the past. No n/v. No food intolerance. No fevers. No light headedness or feeling faint.    Allergies:  Review of patient's allergies indicates:   Allergen Reactions    Pcn [penicillins] Hives    Sulfa (sulfonamide antibiotics) Other (See Comments)     Pt reports she felt really weak       Home Medications:  No current facility-administered medications on file prior to encounter.      Current Outpatient Medications on File Prior to Encounter   Medication Sig    escitalopram oxalate (LEXAPRO) 10 MG tablet Take 10 mg by mouth once daily.    oxyCODONE-acetaminophen (PERCOCET) 5-325 mg per tablet Take 1 tablet by mouth every 6 (six) hours as needed for Pain.    pantoprazole (PROTONIX) 40 MG tablet Take 1 tablet (40 mg total) by mouth once daily.    senna-docusate 8.6-50 mg (PERICOLACE) 8.6-50 mg per tablet Take 1 tablet by mouth 2 (two) times daily.       Past Medical History:   Diagnosis Date    Anxiety     Cervical vertebral fusion     Depression      Past Surgical History:   Procedure Laterality Date    CARPAL TUNNEL RELEASE      left and right wrists    CERVICAL FUSION  2016     SECTION, CLASSIC      GASTRECTOMY  2020    Procedure: GASTRECTOMY;  Surgeon: Cristiano Alva MD;  Location: [facility] OR;  Service: General;;    INTRAUTERINE DEVICE INSERTION      SPLENECTOMY N/A 2020    Procedure: SPLENECTOMY;  Surgeon: Cristiano Alva MD;  Location: [facility] OR;  Service: General;  Laterality: N/A;     No family  history on file.  Social History     Tobacco Use    Smoking status: Current Every Day Smoker     Packs/day: 1.00     Years: 22.00     Pack years: 22.00     Types: Cigarettes     Start date: 1998    Smokeless tobacco: Never Used    Tobacco comment: Ambulatory referral to Smoking Cessation program.    Substance Use Topics    Alcohol use: No    Drug use: Yes     Types: Marijuana        Review of Systems:  Constitutional: no fever or chills  ENT: no nasal congestion or sore throat  Respiratory: no cough or shortness of breath  Cardiovascular: no chest pain or palpitations  Gastrointestinal: no nausea, vomiting, abdominal pain, or change in bowel habits  Genitourinary: no hematuria or dysuria  Integument/Breast: no rash or pruritis  Hematologic/Lymphatic: no easy bruising or lymphadenopathy  Musculoskeletal: no arthralgias or myalgias  Neurological: no seizures or tremors  Behavioral/Psych: no auditory or visual hallucinations    OBJECTIVE:     Vital Signs:  Temp: 97.5 °F (36.4 °C) (06/18/20 1626)  Pulse: 74 (06/18/20 1626)  Resp: 18 (06/18/20 1626)  BP: 121/63 (06/18/20 1626)  SpO2: 99 % (06/18/20 1626)    Physical Exam:  General: well developed, well nourished, no distress  HEENT: normocephalic, atraumatic, EOMI, trachea midline  Lungs:  normal respiratory effort on room air  Cardiovascular: regular rate and rhythm  Abdomen: soft, mildly, no distention, no rebound, no guarding. No redness or drainage around incision  Extremities: no cyanosis, edema, or clubbing  Skin:  no rashes or lesions  Psych/Behavioral:  alert and oriented, appropriate affect    Laboratory:  CBC: No results for input(s): WBC, RBC, HGB, HCT, PLT, MCV, MCH, MCHC in the last 168 hours.  CMP: No results for input(s): GLU, CALCIUM, ALBUMIN, PROT, NA, K, CO2, CL, BUN, CREATININE, ALKPHOS, ALT, AST, BILITOT in the last 168 hours.    Diagnostic Results:  Labs performed at outside facility reviewed. Mild anemia Hgb 10.2    CT abd/pelvis: some  thickening near     ASSESSMENT:     39 yoF s/p ex lap with antrectomy and B2 reconstruction with melena.    PLAN:     -admit for observation  -BID IV PPI  -GI consulted for possible EGD  -IR consulted for drainage of incisional fluid collection (seroma vs abscess)  -NPO for, advance diet as tolerated    Pearl Eaton MD   [facility] PGY2

## 2020-06-19 NOTE — PLAN OF CARE
Cued into room to do admit questions. Introduced self as VN for this shift. Admission questions completed by VN. Educated pt on VTE risk, safety precautions, and VN's role in pt care. Education on fall precautions. Patient verbalized understanding.  Opportunity given for pt's questions. All questions answered.

## 2020-06-19 NOTE — PLAN OF CARE
Reason for Consultation:   melena     History of Present Illness:  39 yoF recently admitted for perforated ulcer who underwent antrectomy with Billroth II reconstruction and splenectomy She was discharged to inpatient psych facility, however for the last 3 days she has noticed melanotic stools. She describes them as black and sticky. No maroon or red blood. Has never had episodes like this in the past. No n/v. No food intolerance. No fevers. No light headedness or feeling faint.     The Sw met with the pt in her room to complete the assessment. The pt lives with her brother in [place]. He will assist her after she's d/c'd from the hospital. The pt's independent with her adl's and doesn't use any dme. The pt has a very supportive family. The pt is a transfer from [facility]. The pt uses [employer] for her medical appointments and states she doesn't have anyone to puck her up at d/c. The Sw called [employer] at [phone]([facility])and arranged the transportation for 12 noon. The auth#[id number] was given to the Sw. The Sw informed the pt of this info mentioned above and she's in agreement with the d/c plan.If the pt will not be d/c'd tomorrow the weekend  Yudith can call and cancel the transportation. The Sw spoke to Dr. Edwards and she states the pt should be ready for d/c tomorrow and to keep the scheduled time. The Sw notified the pt's nurse Yumiko of this info and she will pass it on in report. The Sw wrote her name and contact info on the white board in the pt's room. The Sw gave her a d/c brochure and encouraged her to call if she has any questions or concerns.        06/19/20 1039   Discharge Assessment   Assessment Type Discharge Planning Assessment   Confirmed/corrected address and phone number on facesheet? Yes  ([id number] [place] [address])   Assessment information obtained from? Patient   Prior to hospitilization cognitive status:  Alert/Oriented   Prior to hospitalization functional status: Independent   Current cognitive status: Alert/Oriented   Current Functional Status: Independent   Lives With sibling(s)   Able to Return to Prior Arrangements yes   Is patient able to care for self after discharge? Yes   Who are your caregiver(s) and their phone number(s)? Kelli Guillen(cousin)[phone]   Patient's perception of discharge disposition home or selfcare   Readmission Within the Last 30 Days no previous admission in last 30 days   Patient currently being followed by outpatient case management? No   Patient currently receives any other outside agency services? No   Equipment Currently Used at Home none   Do you have any problems affording any of your prescribed medications? No  (the pt receives her meds affordably at [facility] in [place])   Is the patient taking medications as prescribed? yes   Does the patient have transportation home? No  (the pt uses [employer] Transportation. The  arranged a transport home for the pt for tomorrow at noon.)   Does the patient receive services at the [facility]? No   Discharge Plan A Home with family   Discharge Plan B Home Health   DME Needed Upon Discharge  none   Patient/Family in Agreement with Plan yes

## 2020-06-19 NOTE — PLAN OF CARE
Ms. Musaccgia is resting and medications were given per orders. PRN medication given for PAIN and sleep. Ambulated to bathroom. No complaints of SOB. Seen eating some food; however, complaints of abd pain afterwards. Call light within reach and Safety maintained.

## 2020-06-20 VITALS
TEMPERATURE: 98 F | OXYGEN SATURATION: 99 % | HEIGHT: 61 IN | RESPIRATION RATE: 16 BRPM | HEART RATE: 79 BPM | SYSTOLIC BLOOD PRESSURE: 106 MMHG | WEIGHT: 132.5 LBS | DIASTOLIC BLOOD PRESSURE: 58 MMHG | BODY MASS INDEX: 25.02 KG/M2

## 2020-06-20 LAB
ALBUMIN SERPL BCP-MCNC: 2.4 G/DL (ref 3.5–5.2)
ALP SERPL-CCNC: 81 U/L (ref 55–135)
ALT SERPL W/O P-5'-P-CCNC: 14 U/L (ref 10–44)
ANION GAP SERPL CALC-SCNC: 5 MMOL/L (ref 8–16)
AST SERPL-CCNC: 16 U/L (ref 10–40)
BASOPHILS # BLD AUTO: 0.04 K/UL (ref 0–0.2)
BASOPHILS NFR BLD: 0.4 % (ref 0–1.9)
BILIRUB SERPL-MCNC: 0.2 MG/DL (ref 0.1–1)
BUN SERPL-MCNC: 10 MG/DL (ref 6–20)
CALCIUM SERPL-MCNC: 8.4 MG/DL (ref 8.7–10.5)
CHLORIDE SERPL-SCNC: 105 MMOL/L (ref 95–110)
CO2 SERPL-SCNC: 26 MMOL/L (ref 23–29)
CREAT SERPL-MCNC: 0.7 MG/DL (ref 0.5–1.4)
DIFFERENTIAL METHOD: ABNORMAL
EOSINOPHIL # BLD AUTO: 0.3 K/UL (ref 0–0.5)
EOSINOPHIL NFR BLD: 3.2 % (ref 0–8)
ERYTHROCYTE [DISTWIDTH] IN BLOOD BY AUTOMATED COUNT: 14.2 % (ref 11.5–14.5)
EST. GFR  (AFRICAN AMERICAN): >60 ML/MIN/1.73 M^2
EST. GFR  (NON AFRICAN AMERICAN): >60 ML/MIN/1.73 M^2
GLUCOSE SERPL-MCNC: 95 MG/DL (ref 70–110)
HCT VFR BLD AUTO: 29 % (ref 37–48.5)
HGB BLD-MCNC: 9.3 G/DL (ref 12–16)
IMM GRANULOCYTES # BLD AUTO: 0.03 K/UL (ref 0–0.04)
IMM GRANULOCYTES NFR BLD AUTO: 0.3 % (ref 0–0.5)
LYMPHOCYTES # BLD AUTO: 2.7 K/UL (ref 1–4.8)
LYMPHOCYTES NFR BLD: 25.7 % (ref 18–48)
MAGNESIUM SERPL-MCNC: 1.9 MG/DL (ref 1.6–2.6)
MCH RBC QN AUTO: 30.9 PG (ref 27–31)
MCHC RBC AUTO-ENTMCNC: 32.1 G/DL (ref 32–36)
MCV RBC AUTO: 96 FL (ref 82–98)
MONOCYTES # BLD AUTO: 0.9 K/UL (ref 0.3–1)
MONOCYTES NFR BLD: 8.5 % (ref 4–15)
NEUTROPHILS # BLD AUTO: 6.4 K/UL (ref 1.8–7.7)
NEUTROPHILS NFR BLD: 61.9 % (ref 38–73)
NRBC BLD-RTO: 0 /100 WBC
PHOSPHATE SERPL-MCNC: 3.2 MG/DL (ref 2.7–4.5)
PLATELET # BLD AUTO: 693 K/UL (ref 150–350)
PMV BLD AUTO: 9.6 FL (ref 9.2–12.9)
POTASSIUM SERPL-SCNC: 3.5 MMOL/L (ref 3.5–5.1)
PROT SERPL-MCNC: 6.1 G/DL (ref 6–8.4)
RBC # BLD AUTO: 3.01 M/UL (ref 4–5.4)
SODIUM SERPL-SCNC: 136 MMOL/L (ref 136–145)
WBC # BLD AUTO: 10.35 K/UL (ref 3.9–12.7)

## 2020-06-20 PROCEDURE — S4991 NICOTINE PATCH NONLEGEND: HCPCS | Performed by: STUDENT IN AN ORGANIZED HEALTH CARE EDUCATION/TRAINING PROGRAM

## 2020-06-20 PROCEDURE — 36415 COLL VENOUS BLD VENIPUNCTURE: CPT

## 2020-06-20 PROCEDURE — 25000003 PHARM REV CODE 250: Performed by: STUDENT IN AN ORGANIZED HEALTH CARE EDUCATION/TRAINING PROGRAM

## 2020-06-20 PROCEDURE — 83735 ASSAY OF MAGNESIUM: CPT

## 2020-06-20 PROCEDURE — 85025 COMPLETE CBC W/AUTO DIFF WBC: CPT

## 2020-06-20 PROCEDURE — G0378 HOSPITAL OBSERVATION PER HR: HCPCS

## 2020-06-20 PROCEDURE — 80053 COMPREHEN METABOLIC PANEL: CPT

## 2020-06-20 PROCEDURE — 84100 ASSAY OF PHOSPHORUS: CPT

## 2020-06-20 RX ORDER — SUCRALFATE 1 G/10ML
1 SUSPENSION ORAL
Qty: 1680 ML | Refills: 0 | Status: SHIPPED | OUTPATIENT
Start: 2020-06-20 | End: 2020-08-01

## 2020-06-20 RX ADMIN — METOCLOPRAMIDE 10 MG: 10 TABLET ORAL at 11:06

## 2020-06-20 RX ADMIN — METOCLOPRAMIDE 10 MG: 10 TABLET ORAL at 05:06

## 2020-06-20 RX ADMIN — OXYCODONE HYDROCHLORIDE 5 MG: 5 TABLET ORAL at 11:06

## 2020-06-20 RX ADMIN — NICOTINE 1 PATCH: 14 PATCH TRANSDERMAL at 09:06

## 2020-06-20 RX ADMIN — SUCRALFATE 1 G: 1 SUSPENSION ORAL at 11:06

## 2020-06-20 RX ADMIN — SUCRALFATE 1 G: 1 SUSPENSION ORAL at 05:06

## 2020-06-20 RX ADMIN — ESCITALOPRAM OXALATE 10 MG: 10 TABLET ORAL at 09:06

## 2020-06-20 RX ADMIN — OXYCODONE HYDROCHLORIDE 5 MG: 5 TABLET ORAL at 05:06

## 2020-06-20 RX ADMIN — PANTOPRAZOLE SODIUM 40 MG: 40 TABLET, DELAYED RELEASE ORAL at 09:06

## 2020-06-20 RX ADMIN — POTASSIUM CHLORIDE 40 MEQ: 1500 TABLET, EXTENDED RELEASE ORAL at 09:06

## 2020-06-20 RX ADMIN — STANDARDIZED SENNA CONCENTRATE AND DOCUSATE SODIUM 1 TABLET: 8.6; 5 TABLET ORAL at 09:06

## 2020-06-20 RX ADMIN — ACETAMINOPHEN 650 MG: 325 TABLET ORAL at 04:06

## 2020-06-20 NOTE — DISCHARGE SUMMARY
[facility]  Discharge Summary    Admit Date:   6/18/2020    Discharge Date:   6/20/2020    Attending Physician:   Cristiano Alva MD    Admission HPI:   40 y/o F s/p antrectomy with Billroth II reconstruction for perforated ulcer and splenectomy who came in ad a transfer after recent discharge.    Hospital Course:   Patient re-presented on 06/18/2020 with complaints of mild postprandial abdominal pain and ongoing melena since 3 days.  She underwent a CT scan at an outside facility which showed some concerns of a fluid collection at her incision site.  After being transferred, the patient was admitted with surgery.  GI was consulted her ongoing melanotic stools.  They recommended continuing outpatient EGD with no indication for EGD during this hospital stay.  IR was consulted for evaluation and drainage of the superficial fluid collection near her incision which was found to be a seroma with no growth of any organisms and culture.  The patient was started on regular diet and Carafate was added.  She was monitored for 2 days.  Her H&H remained stable.  Melanotic stools also began to decrease in frequency and improve.  Once she was tolerating a diet with abdominal pain resolving and well controlled, it was decided she was ready for discharge.    Patient is to:    - Keep 6 week follow-up with Dr. Quinonez    - Continue Carafate for 4 weeks and PPI as previously prescribed    - Keep previously scheduled outpatient follow-up with GI    Final Diagnoses:  Active Hospital Problems    Diagnosis  POA    *Abdominal pain [R10.9]  Yes      Resolved Hospital Problems   No resolved problems to display.     Discharged Condition:   stable    Disposition:    Home or Self Care    Discharge Instructions:     - Keep 6 week follow-up with Dr. Quinonez    - Continue Carafate for 4 weeks and PPI as previously prescribed    - Keep previously scheduled outpatient follow-up with GI    Discharge  Procedure Orders   Diet Adult Regular     Notify your health care provider if you experience any of the following:  temperature >100.4     Notify your health care provider if you experience any of the following:  persistent nausea and vomiting or diarrhea     Notify your health care provider if you experience any of the following:  severe uncontrolled pain     Notify your health care provider if you experience any of the following:  redness, tenderness, or signs of infection (pain, swelling, redness, odor or green/yellow discharge around incision site)     No dressing needed     Activity as tolerated     Follow-up Information     Cristiano Alva MD. Schedule an appointment as soon as possible for a visit in 6 weeks.    Specialty: General Surgery  Why: Offices closed for Weekend. Patient to schedule own follow up appointment.    Contact information:  [address]  [address]  [place] [address] [phone]                 Current Discharge Medication List      START taking these medications    Details   sucralfate (CARAFATE) 100 mg/mL suspension Take 10 mLs (1 g total) by mouth 4 (four) times daily before meals and nightly.  Qty: 1680 mL, Refills: 0         CONTINUE these medications which have NOT CHANGED    Details   escitalopram oxalate (LEXAPRO) 10 MG tablet Take 10 mg by mouth once daily.      oxyCODONE-acetaminophen (PERCOCET) 5-325 mg per tablet Take 1 tablet by mouth every 6 (six) hours as needed for Pain.  Qty: 20 tablet, Refills: 0    Comments: Quantity prescribed more than 7 day supply? No      pantoprazole (PROTONIX) 40 MG tablet Take 1 tablet (40 mg total) by mouth once daily.  Qty: 30 tablet, Refills: 1      senna-docusate 8.6-50 mg (PERICOLACE) 8.6-50 mg per tablet Take 1 tablet by mouth 2 (two) times daily.  Qty: 30 tablet, Refills: 0           Alessia Patel MD

## 2020-06-20 NOTE — PLAN OF CARE
Pt alert and oriented x4. Up to toilet without assist. Staples to ML incision removed, no c/o of nausea, tolerating regualr diet in small portions. No c/o pain. Expecting DC tomorrow.

## 2020-06-20 NOTE — PLAN OF CARE
Pt AOx4, VSS. IV CDI, saline locked. Abdominal incision CDI, no drainage noted. Pt c/o abdominal pain once throughout the night, prn pain medication given with relief. SCDs maintained, bed alarm maintained. Pt rested throughout the night with no other complaints. Encouraged to call with questions/for assistance. Will continue to monitor.

## 2020-06-20 NOTE — PROGRESS NOTES
TN contacted Dr Edwards, pt will be d/c today. Awaiting d/c orders to be entered. SW arranged transportation on Friday for pt. [facility] [phone] Auth [id number]. TN called andspoke with Keysha, she confirmed E[place] for Noon  today to [id number] [place] [address].

## 2020-06-20 NOTE — PLAN OF CARE
SW arranged transportation on Friday for pt. [facility] [phone] Auth [id number]. TN called andspoke with Keysha, [facility] [place]  [facility] [id number] [facility] [address]    Discharge orders noted, no HH or HME ordered.    Pt's nurse will go over medications/signs and symptoms prior to discharge       06/20/20 1145   Final Note   Assessment Type Final Discharge Note   Anticipated Discharge Disposition Home   What phone number can be called within the next 1-3 days to see how you are doing after discharge? [phone]   Hospital Follow Up  Appt(s) scheduled? No  (Offices closed for Weekend. Patient to schedule own follow up appointment.)   Right Care Referral Info   Post Acute Recommendation No Care     Yudith Turk, RN Transitional Navigator  [phone]

## 2020-06-20 NOTE — NURSING
Patient is being discharged home.  Discharge instructions on medications, signs and symptoms when to call the MD, and follow-up visit are given to the patient.  Patient verbalized complete understanding on the above discharge instructions.  Prescription on Carefate given to patient.  Patient is eating lunch at this time.  Will walk patient to ED entrance for discharge.  Voiced no other concerns.  Safety maintained.

## 2020-06-20 NOTE — PROGRESS NOTES
GENERAL SURGERY PROGRESS NOTE    NAEON  No further episodes of melena since arrival  Tolerating diet, increased abd pain after eating  IR drained 35 cc old hematoma from incision    Inpatient Data      Vitals:   Temp:  [98.1 °F (36.7 °C)-98.5 °F (36.9 °C)]   Pulse:  [77-90]   Resp:  [16-18]   BP: ()/(43-78)   SpO2:  [99 %]     Diet Adult Regular (IDDSI Level 7)   Intake/Output Summary (Last 24 hours) at 6/20/2020 0741  Last data filed at 6/20/2020 0500  Gross per 24 hour   Intake 910 ml   Output --   Net 910 ml          Physical Exam:  AFVSS  Gen - NAD  Chest - RRR, NWOB on RA  Abd - soft, ND, approp TTP around incision, midline incision C/D/I with no signs of infxn or break down    Laboratory:  CBC:   Recent Labs   Lab 06/20/20  0609   WBC 10.35   RBC 3.01*   HGB 9.3*   HCT 29.0*   *   MCV 96   MCH 30.9   MCHC 32.1     CMP:   Recent Labs   Lab 06/20/20  0609   GLU 95   CALCIUM 8.4*   ALBUMIN 2.4*   PROT 6.1      K 3.5   CO2 26      BUN 10   CREATININE 0.7   ALKPHOS 81   ALT 14   AST 16   BILITOT 0.2       ASSESSMENT/PLAN:      39 yoF s/p ex lap with antrectomy and B2 reconstruction, splenectomy with melena.    -cont BID PPI, add carafate  -GI would like to wait for endoscopy until farther out post op  -continue regular diet with frequent small meals  -H/H stable at 9.3 no melanotic stools   PPx: Lovenox, PPI         Emilia Edwards MD   [facility] PGY-2

## 2020-06-22 LAB
BACTERIA SPEC AEROBE CULT: ABNORMAL
BACTERIA SPEC ANAEROBE CULT: NORMAL

## 2020-06-30 LAB
FUNGUS SPEC CULT: ABNORMAL
FUNGUS SPEC CULT: ABNORMAL

## 2020-07-21 LAB — FUNGUS SPEC CULT: ABNORMAL

## 2020-08-02 LAB
ACID FAST MOD KINY STN SPEC: NORMAL
MYCOBACTERIUM SPEC QL CULT: NORMAL

## 2020-08-03 ENCOUNTER — PATIENT MESSAGE (OUTPATIENT)
Dept: NEUROLOGY | Facility: HOSPITAL | Age: 40
End: 2020-08-03

## 2020-08-03 ENCOUNTER — TELEPHONE (OUTPATIENT)
Dept: NEUROLOGY | Facility: HOSPITAL | Age: 40
End: 2020-08-03

## 2020-08-10 ENCOUNTER — HOSPITAL ENCOUNTER (EMERGENCY)
Facility: HOSPITAL | Age: 40
Discharge: HOME | End: 2020-08-11
Attending: EMERGENCY MEDICINE
Payer: MEDICAID

## 2020-08-10 DIAGNOSIS — R10.13 ABDOMINAL WALL PAIN IN EPIGASTRIC REGION: Primary | ICD-10-CM

## 2020-08-10 LAB
ALBUMIN SERPL BCP-MCNC: 2.9 G/DL (ref 3.5–5.2)
ALP SERPL-CCNC: 97 U/L (ref 55–135)
ALT SERPL W/O P-5'-P-CCNC: 20 U/L (ref 10–44)
ANION GAP SERPL CALC-SCNC: 5 MMOL/L (ref 8–16)
AST SERPL-CCNC: 18 U/L (ref 10–40)
BACTERIA #/AREA URNS HPF: ABNORMAL /HPF
BASOPHILS # BLD AUTO: 0.05 K/UL (ref 0–0.2)
BASOPHILS NFR BLD: 0.4 % (ref 0–1.9)
BILIRUB SERPL-MCNC: 0.1 MG/DL (ref 0.1–1)
BILIRUB UR QL STRIP: NEGATIVE
BUN SERPL-MCNC: 10 MG/DL (ref 6–20)
CALCIUM SERPL-MCNC: 8.5 MG/DL (ref 8.7–10.5)
CHLORIDE SERPL-SCNC: 106 MMOL/L (ref 95–110)
CLARITY UR: CLEAR
CO2 SERPL-SCNC: 30 MMOL/L (ref 23–29)
COLOR UR: YELLOW
CREAT SERPL-MCNC: 0.8 MG/DL (ref 0.5–1.4)
DIFFERENTIAL METHOD: ABNORMAL
EOSINOPHIL # BLD AUTO: 0.3 K/UL (ref 0–0.5)
EOSINOPHIL NFR BLD: 2.3 % (ref 0–8)
ERYTHROCYTE [DISTWIDTH] IN BLOOD BY AUTOMATED COUNT: 14 % (ref 11.5–14.5)
EST. GFR  (AFRICAN AMERICAN): >60 ML/MIN/1.73 M^2
EST. GFR  (NON AFRICAN AMERICAN): >60 ML/MIN/1.73 M^2
GLUCOSE SERPL-MCNC: 103 MG/DL (ref 70–110)
GLUCOSE UR QL STRIP: NEGATIVE
HCT VFR BLD AUTO: 33.3 % (ref 37–48.5)
HGB BLD-MCNC: 11 G/DL (ref 12–16)
HGB UR QL STRIP: ABNORMAL
IMM GRANULOCYTES # BLD AUTO: 0.03 K/UL (ref 0–0.04)
IMM GRANULOCYTES NFR BLD AUTO: 0.2 % (ref 0–0.5)
KETONES UR QL STRIP: NEGATIVE
LEUKOCYTE ESTERASE UR QL STRIP: NEGATIVE
LIPASE SERPL-CCNC: 17 U/L (ref 4–60)
LYMPHOCYTES # BLD AUTO: 3.5 K/UL (ref 1–4.8)
LYMPHOCYTES NFR BLD: 27.5 % (ref 18–48)
MCH RBC QN AUTO: 31.8 PG (ref 27–31)
MCHC RBC AUTO-ENTMCNC: 33 G/DL (ref 32–36)
MCV RBC AUTO: 96 FL (ref 82–98)
MICROSCOPIC COMMENT: ABNORMAL
MONOCYTES # BLD AUTO: 0.6 K/UL (ref 0.3–1)
MONOCYTES NFR BLD: 4.6 % (ref 4–15)
NEUTROPHILS # BLD AUTO: 8.2 K/UL (ref 1.8–7.7)
NEUTROPHILS NFR BLD: 65 % (ref 38–73)
NITRITE UR QL STRIP: NEGATIVE
NRBC BLD-RTO: 0 /100 WBC
PH UR STRIP: 7 [PH] (ref 5–8)
PLATELET # BLD AUTO: 682 K/UL (ref 150–350)
PMV BLD AUTO: 9.1 FL (ref 9.2–12.9)
POTASSIUM SERPL-SCNC: 3.4 MMOL/L (ref 3.5–5.1)
PROT SERPL-MCNC: 7 G/DL (ref 6–8.4)
PROT UR QL STRIP: NEGATIVE
RBC # BLD AUTO: 3.46 M/UL (ref 4–5.4)
RBC #/AREA URNS HPF: 20 /HPF (ref 0–4)
SODIUM SERPL-SCNC: 141 MMOL/L (ref 136–145)
SP GR UR STRIP: 1.02 (ref 1–1.03)
URN SPEC COLLECT METH UR: ABNORMAL
UROBILINOGEN UR STRIP-ACNC: NEGATIVE EU/DL
WBC # BLD AUTO: 12.61 K/UL (ref 3.9–12.7)
WBC #/AREA URNS HPF: 1 /HPF (ref 0–5)

## 2020-08-10 PROCEDURE — 99284 EMERGENCY DEPT VISIT MOD MDM: CPT | Mod: 25

## 2020-08-10 PROCEDURE — 81000 URINALYSIS NONAUTO W/SCOPE: CPT

## 2020-08-10 PROCEDURE — 85025 COMPLETE CBC W/AUTO DIFF WBC: CPT

## 2020-08-10 PROCEDURE — 80053 COMPREHEN METABOLIC PANEL: CPT

## 2020-08-10 PROCEDURE — 83690 ASSAY OF LIPASE: CPT

## 2020-08-11 VITALS
BODY MASS INDEX: 25.13 KG/M2 | RESPIRATION RATE: 18 BRPM | OXYGEN SATURATION: 98 % | TEMPERATURE: 98 F | SYSTOLIC BLOOD PRESSURE: 143 MMHG | HEIGHT: 60 IN | DIASTOLIC BLOOD PRESSURE: 87 MMHG | WEIGHT: 128 LBS | HEART RATE: 83 BPM

## 2020-08-11 PROCEDURE — 25000003 PHARM REV CODE 250: Performed by: EMERGENCY MEDICINE

## 2020-08-11 RX ORDER — OXYCODONE AND ACETAMINOPHEN 5; 325 MG/1; MG/1
1 TABLET ORAL
Status: COMPLETED | OUTPATIENT
Start: 2020-08-11 | End: 2020-08-11

## 2020-08-11 RX ORDER — OXYCODONE AND ACETAMINOPHEN 5; 325 MG/1; MG/1
2 TABLET ORAL
Status: DISCONTINUED | OUTPATIENT
Start: 2020-08-11 | End: 2020-08-11

## 2020-08-11 RX ADMIN — OXYCODONE HYDROCHLORIDE AND ACETAMINOPHEN 1 TABLET: 5; 325 TABLET ORAL at 12:08

## 2020-08-11 NOTE — ED PROVIDER NOTES
Encounter Date: 8/10/2020    SCRIBE #1 NOTE: I, Nneka Mc, am scribing for, and in the presence of,  Dr. Gilbert. I have scribed the entire note.       History     Chief Complaint   Patient presents with    Abdominal Pain     sx for ruptured ulcer x2 months ago at this facility. epigastric painx1 week. was seen at a different facility but did not have pain relieved. unknown who performed sx. denies nausea/ vomiting     Zandra Eckert is a 40 y.o. female who  has a past medical history of Anxiety, Cervical vertebral fusion, and Depression.    The patient presents to the ED due to abdominal pain. Pain is located to the midepigastric area. Onset began one and a half weeks ago and have worsened since. She was seen at a nearby hospital a few days ago and given potassium but was reports they never addressed her abdominal pain. Patient denies melena, hematuria, hematemesis, or any bleeding anywhere else. Patient denies fever or chills. Patient reports a recent splenectomy and gastrectomy due to an ulcer that erupted in her intestines. She notes scheduling an appointment with her surgeon on  but notes pain is too severe to wait until then.     The history is provided by the patient. No  was used.     Review of patient's allergies indicates:   Allergen Reactions    Pcn [penicillins] Hives    Sulfa (sulfonamide antibiotics) Other (See Comments)     Pt reports she felt really weak     Past Medical History:   Diagnosis Date    Anxiety     Cervical vertebral fusion     Depression      Past Surgical History:   Procedure Laterality Date    CARPAL TUNNEL RELEASE      left and right wrists    CERVICAL FUSION  2016     SECTION, CLASSIC      GASTRECTOMY  2020    Procedure: GASTRECTOMY;  Surgeon: Cristiano Alva MD;  Location: [facility];  Service: General;;    INTRAUTERINE DEVICE INSERTION      SPLENECTOMY N/A 2020    Procedure: SPLENECTOMY;  Surgeon: Cristiano  MD Atrie;  Location: [facility] OR;  Service: General;  Laterality: N/A;     Family History   Problem Relation Age of Onset    No Known Problems Mother     No Known Problems Father     No Known Problems Sister     No Known Problems Brother     No Known Problems Daughter     No Known Problems Son     No Known Problems Maternal Aunt     No Known Problems Maternal Uncle     No Known Problems Paternal Aunt     No Known Problems Paternal Uncle     No Known Problems Maternal Grandmother     No Known Problems Maternal Grandfather     No Known Problems Paternal Grandmother     No Known Problems Paternal Grandfather      Social History     Tobacco Use    Smoking status: Current Every Day Smoker     Packs/day: 1.00     Years: 22.00     Pack years: 22.00     Types: Cigarettes     Start date: 1998    Smokeless tobacco: Never Used    Tobacco comment: Ambulatory referral to Smoking Cessation program.    Substance Use Topics    Alcohol use: No    Drug use: Yes     Types: Marijuana     Review of Systems   Constitutional: Negative for fever.   HENT: Negative for sore throat.    Respiratory: Negative for shortness of breath.    Cardiovascular: Negative for chest pain.   Gastrointestinal: Positive for abdominal pain. Negative for nausea.   Genitourinary: Negative for dysuria.   Musculoskeletal: Negative for back pain.   Skin: Negative for rash.   Neurological: Negative for weakness.   Hematological: Does not bruise/bleed easily.       Physical Exam     Initial Vitals [08/10/20 2129]   BP Pulse Resp Temp SpO2   (!) 163/79 83 18 98.2 °F (36.8 °C) 100 %      MAP       --         Physical Exam    Nursing note and vitals reviewed.  Constitutional: She appears well-developed and well-nourished. She is not diaphoretic. No distress.   HENT:   Head: Normocephalic and atraumatic.   Eyes: Conjunctivae and EOM are normal.   Neck: Normal range of motion. Neck supple.   Cardiovascular: Normal rate, regular rhythm and normal  heart sounds.   Pulmonary/Chest: Breath sounds normal. No respiratory distress.   Abdominal: Soft. There is abdominal tenderness.   Tenderness located to the upper portion of surgical scar    Musculoskeletal: Normal range of motion. No tenderness or edema.   Neurological: She is alert and oriented to person, place, and time. She has normal strength.   Skin: Skin is warm and dry. Capillary refill takes less than 2 seconds.         ED Course   Procedures  Labs Reviewed   CBC W/ AUTO DIFFERENTIAL - Abnormal; Notable for the following components:       Result Value    RBC 3.46 (*)     Hemoglobin 11.0 (*)     Hematocrit 33.3 (*)     Mean Corpuscular Hemoglobin 31.8 (*)     Platelets 682 (*)     MPV 9.1 (*)     Gran # (ANC) 8.2 (*)     All other components within normal limits   COMPREHENSIVE METABOLIC PANEL - Abnormal; Notable for the following components:    Potassium 3.4 (*)     CO2 30 (*)     Calcium 8.5 (*)     Albumin 2.9 (*)     Anion Gap 5 (*)     All other components within normal limits   URINALYSIS, REFLEX TO URINE CULTURE - Abnormal; Notable for the following components:    Occult Blood UA 2+ (*)     All other components within normal limits    Narrative:     Specimen Source->Urine   URINALYSIS MICROSCOPIC - Abnormal; Notable for the following components:    RBC, UA 20 (*)     Bacteria Few (*)     All other components within normal limits    Narrative:     Specimen Source->Urine   LIPASE          Imaging Results          CT Abdomen Pelvis  Without Contrast (Final result)  Result time 08/10/20 23:22:10    Final result by Pepe Gaines MD (08/10/20 23:22:10)                 Impression:      Postoperative changes of partial gastrectomy and splenectomy noted.  Postoperative change of the anterior abdominal wall is noted with some thickening and some haziness and stranding that may relate to edema or inflammation however there is no evidence for abscess collection.    There is nonspecific mild-to-moderate  distention of the stomach with ingested material and air, this need not represent abnormal appearance, and may relate to recent ingestion of food, however the stomach is somewhat more prominent than the small bowel, and therefore correlation for signs or symptoms of gastric outlet obstructive change or delayed gastric emptying is needed.    Nonobstructing nephrolithiasis of the kidneys bilaterally noted without evidence for ureteral calculus or obstructive uropathy.    Mild prominent appearance of the region of the lower uterine segment/cervix for which clinical and historical correlation and evaluation is recommended.    Mild free fluid in the lower pelvis, nonspecific may be physiologic.      Electronically signed by: Pepe Gaines  Date:    08/10/2020  Time:    23:22             Narrative:    EXAMINATION:  CT ABDOMEN PELVIS WITHOUT CONTRAST    CLINICAL HISTORY:  Abdominal pain, fever, post-op;    TECHNIQUE:  Low dose axial images, sagittal and coronal reformations were obtained from the lung bases to the pubic symphysis.  Intravenous contrast and oral contrast was not utilized.    COMPARISON:  May 29, 2020    FINDINGS:  The visualized lung bases appear clear.  In the interim since the prior examination the patient has undergone operative procedure, history of partial gastrectomy and splenectomy.  Associated postoperative change is noted, including postoperative change of the stomach, and absence of the spleen, as well as appearance of midline anterior abdominal wall postoperative change, and some umbilical/periumbilical thickening, there is some stranding and haziness along the postoperative anterior abdominal wall that may relate to mild edema or inflammation however there is no well-formed fluid collection to specifically suggest abscess collection.    The remaining stomach demonstrates mild to moderate distention with ingested material and air, nonspecific appearance.  There is no evidence for pericholecystic  or peripancreatic inflammatory change.  There is a small soft tissue nodule of the left upper quadrant this could relate to a small remaining splenic nodule.  There is no evidence for acute process of the liver, or adrenal glands.  Bilateral nonobstructing nephrolithiasis is noted.  There is no evidence for hydronephrosis or obstructive uropathy bilaterally.  The ureters are difficult to delineate in their entirety along their course to the urinary bladder however there is no evidence for hydroureter and no definitive cholelithiasis, several calcifications in the pelvis are most consistent with phleboliths and appears similar to the prior exam.  The abdominal aorta demonstrates atherosclerotic change, appears normal in caliber otherwise not well evaluated on this exam.  The urinary bladder appears unremarkable for degree of distention.  There is mild prominent appearance of the region of the lower uterine segment/cervix for which clinical and historical correlation and evaluation is recommended.  There is no evidence for dominant adnexal mass or cystic collection.  Mild free fluid at the cul-de-sac noted, nonspecific may be physiologic.    There is no evidence for small bowel obstructive process.  The appendix is identified, it does not appear inflamed.  There is no evidence for inflammatory or obstructive process of the colon.  There is no evidence for free intraperitoneal air.  The visualized osseous structures appear intact, chronic changes are noted.  Remote rib fractures on the left are noted.                                                   ED Course as of Aug 11 0032   Tue Aug 11, 2020   0028 Labs are within normal limits.  The patient has some microscopic hematuria however she has no ureterolithiasis on her CT scan and no flank pain.  The patient has a prominent stomach compared to the small-bowel however the patient has had no nausea or vomiting, no difficulty digesting food and is in fact asking for apple  juice right now.  Her tenderness is directly over the superior aspect of her scar.  There is no hernia palpable.  After I palpated this area, she states she has persistent pain to that area.  I feel that her pain is most likely due to tenderness of the scar or maybe an early hernia is developing.  She has an appointment in 2 days with Dr. Quinonez and he will be able to address this issue with her.  She has no underlying pathology on the CT scan and will be discharged home.  I will give her a Percocet in the emergency department for the pain.    [ST]      ED Course User Index  [ST] Rebecca Gilbert MD                Clinical Impression:       ICD-10-CM ICD-9-CM   1. Abdominal wall pain in epigastric region  R10.13 789.06         Disposition:   Disposition: Discharged  Condition: Stable     ED Disposition Condition    Discharge Stable        ED Prescriptions     None        Follow-up Information    None                         I, Rebecca Gilbert, personally performed the services described in this documentation. All medical record entries made by the scribe were at my direction and in my presence.  I have reviewed the chart and agree that the record reflects my personal performance and is accurate and complete. Rebecca Gilbert M.D. 12:32 AM08/11/2020         Rebecca Gilbert MD  08/11/20 0032

## 2020-08-11 NOTE — PROGRESS NOTES
[facility]:  [facility]  A collaboration between [facility] and [facility]      PATIENT: Zandra Eckert  MRN: [id number]  DATE: 2020    Subjective:      Chief Complaint: hospital f/u    Came to Er with RUQ pain  No fever or chills    Vitals: Blood pressure 133/89, pulse 104, temperature 99.8 °F (37.7 °C), temperature source Oral, height 5' (1.524 m), weight 61.7 kg (136 lb 0.4 oz).     ECOG Score: 1 - Symptomatic but completely ambulatory    Diagnosis: No diagnosis found.     Interval History:    39 yoF recently admitted for perforated ulcer who underwent antrectomy with Billroth II reconstruction and splenectomy She was discharged to inpatient psych facility, however for the last 3 days she has noticed melanotic stools. She describes them as black and sticky. No maroon or red blood. Has never had episodes like this in the past. No n/v. No food intolerance. No fevers. No light headedness or feeling faint.  39 yoF s/p ex lap with antrectomy and B2 reconstruction with melena.        Past Medical History:  Past Medical History:   Diagnosis Date    Anxiety     Cervical vertebral fusion     Depression        Past Surgical History:  Past Surgical History:   Procedure Laterality Date    CARPAL TUNNEL RELEASE      left and right wrists    CERVICAL FUSION  2016     SECTION, CLASSIC      GASTRECTOMY  2020    Procedure: GASTRECTOMY;  Surgeon: Cristiano Avla MD;  Location: [facility] OR;  Service: General;;    INTRAUTERINE DEVICE INSERTION      SPLENECTOMY N/A 2020    Procedure: SPLENECTOMY;  Surgeon: Cristiano Alva MD;  Location: [facility] OR;  Service: General;  Laterality: N/A;       Family History:  Family History   Problem Relation Age of Onset    No Known Problems Mother     No Known Problems Father     No Known Problems Sister     No Known Problems Brother     No Known Problems  Daughter     No Known Problems Son     No Known Problems Maternal Aunt     No Known Problems Maternal Uncle     No Known Problems Paternal Aunt     No Known Problems Paternal Uncle     No Known Problems Maternal Grandmother     No Known Problems Maternal Grandfather     No Known Problems Paternal Grandmother     No Known Problems Paternal Grandfather        Allergies:  Pcn [penicillins] and Sulfa (sulfonamide antibiotics)    Medications:   Current Outpatient Medications   Medication Sig    dextroamphetamine-amphetamine (ADDERALL XR) 20 MG 24 hr capsule Take 20 mg by mouth.    escitalopram oxalate (LEXAPRO) 10 MG tablet Take 10 mg by mouth once daily.    multivitamin capsule Take 1 capsule by mouth.    potassium chloride SA (K-DUR,KLOR-CON) 20 MEQ tablet Take 20 mEq by mouth.    senna-docusate 8.6-50 mg (PERICOLACE) 8.6-50 mg per tablet Take 1 tablet by mouth 2 (two) times daily.    oxyCODONE-acetaminophen (PERCOCET) 5-325 mg per tablet Take 1 tablet by mouth every 6 (six) hours as needed for Pain. (Patient not taking: Reported on 8/13/2020)    pantoprazole (PROTONIX) 40 MG tablet Take 1 tablet (40 mg total) by mouth once daily.     No current facility-administered medications for this visit.         Review of Systems   Objective:      Physical Exam   Assessment:       No diagnosis found.    Laboratory Data:       Scans:   CT Abdomen Pelvis  Without Contrast  Narrative: EXAMINATION:  CT ABDOMEN PELVIS WITHOUT CONTRAST    CLINICAL HISTORY:  Abdominal pain, fever, post-op;    TECHNIQUE:  Low dose axial images, sagittal and coronal reformations were obtained from the lung bases to the pubic symphysis.  Intravenous contrast and oral contrast was not utilized.    COMPARISON:  May 29, 2020    FINDINGS:  The visualized lung bases appear clear.  In the interim since the prior examination the patient has undergone operative procedure, history of partial gastrectomy and splenectomy.  Associated postoperative  change is noted, including postoperative change of the stomach, and absence of the spleen, as well as appearance of midline anterior abdominal wall postoperative change, and some umbilical/periumbilical thickening, there is some stranding and haziness along the postoperative anterior abdominal wall that may relate to mild edema or inflammation however there is no well-formed fluid collection to specifically suggest abscess collection.    The remaining stomach demonstrates mild to moderate distention with ingested material and air, nonspecific appearance.  There is no evidence for pericholecystic or peripancreatic inflammatory change.  There is a small soft tissue nodule of the left upper quadrant this could relate to a small remaining splenic nodule.  There is no evidence for acute process of the liver, or adrenal glands.  Bilateral nonobstructing nephrolithiasis is noted.  There is no evidence for hydronephrosis or obstructive uropathy bilaterally.  The ureters are difficult to delineate in their entirety along their course to the urinary bladder however there is no evidence for hydroureter and no definitive cholelithiasis, several calcifications in the pelvis are most consistent with phleboliths and appears similar to the prior exam.  The abdominal aorta demonstrates atherosclerotic change, appears normal in caliber otherwise not well evaluated on this exam.  The urinary bladder appears unremarkable for degree of distention.  There is mild prominent appearance of the region of the lower uterine segment/cervix for which clinical and historical correlation and evaluation is recommended.  There is no evidence for dominant adnexal mass or cystic collection.  Mild free fluid at the cul-de-sac noted, nonspecific may be physiologic.    There is no evidence for small bowel obstructive process.  The appendix is identified, it does not appear inflamed.  There is no evidence for inflammatory or obstructive process of the  colon.  There is no evidence for free intraperitoneal air.  The visualized osseous structures appear intact, chronic changes are noted.  Remote rib fractures on the left are noted.  Impression: Postoperative changes of partial gastrectomy and splenectomy noted.  Postoperative change of the anterior abdominal wall is noted with some thickening and some haziness and stranding that may relate to edema or inflammation however there is no evidence for abscess collection.    There is nonspecific mild-to-moderate distention of the stomach with ingested material and air, this need not represent abnormal appearance, and may relate to recent ingestion of food, however the stomach is somewhat more prominent than the small bowel, and therefore correlation for signs or symptoms of gastric outlet obstructive change or delayed gastric emptying is needed.    Nonobstructing nephrolithiasis of the kidneys bilaterally noted without evidence for ureteral calculus or obstructive uropathy.    Mild prominent appearance of the region of the lower uterine segment/cervix for which clinical and historical correlation and evaluation is recommended.    Mild free fluid in the lower pelvis, nonspecific may be physiologic.    Electronically signed by: Pepe Gaines  Date:    08/10/2020  Time:    23:22       Impression: postop pain  On exam incision looks good    The CT scan was reviewed with the previous kit a CT scan done talked to  and Ct shows improvement , no hernia    Plan:       Postop    constiaption vs delayed gastric emptying    Add PEPE Watkins MD, FACS   Associate Professor of Surgery, [facility]   Neuroendocrine Surgery, Hepatic/Pancreatic & General Surgery   [address], [address]   [place] [address]   ph. [phone]; [phone]   fax. [phone]

## 2020-08-11 NOTE — ED TRIAGE NOTES
Pt c/o mid-epigastric and R side abdominal pain X 1 wk. Pt states she had a ruptured abdominal ulcer 2 mos ago. Pt rates pain 6-10 at this time. Pt states no issues with stool and LBM 8/10 normal in appearance. Pt denies N/V/D. No fever noted. Pt states she feels uncomfortable and bloated. Will cont to monitor.
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
## 2020-08-13 ENCOUNTER — OFFICE VISIT (OUTPATIENT)
Dept: NEUROLOGY | Facility: HOSPITAL | Age: 40
End: 2020-08-13
Attending: SURGERY
Payer: MEDICAID

## 2020-08-13 VITALS
HEART RATE: 104 BPM | HEIGHT: 60 IN | TEMPERATURE: 100 F | WEIGHT: 136 LBS | DIASTOLIC BLOOD PRESSURE: 89 MMHG | SYSTOLIC BLOOD PRESSURE: 133 MMHG | BODY MASS INDEX: 26.7 KG/M2

## 2020-08-13 DIAGNOSIS — Z09 POSTOP CHECK: ICD-10-CM

## 2020-08-13 DIAGNOSIS — Z90.3 HISTORY OF GASTRECTOMY: Primary | ICD-10-CM

## 2020-08-13 PROCEDURE — 99214 OFFICE O/P EST MOD 30 MIN: CPT | Performed by: SURGERY

## 2020-08-13 RX ORDER — DEXTROAMPHETAMINE SACCHARATE, AMPHETAMINE ASPARTATE MONOHYDRATE, DEXTROAMPHETAMINE SULFATE AND AMPHETAMINE SULFATE 5; 5; 5; 5 MG/1; MG/1; MG/1; MG/1
20 CAPSULE, EXTENDED RELEASE ORAL
COMMUNITY

## 2020-08-13 RX ORDER — METOCLOPRAMIDE 10 MG/1
10 TABLET ORAL 4 TIMES DAILY
Qty: 45 TABLET | Refills: 1 | Status: SHIPPED | OUTPATIENT
Start: 2020-08-13

## 2020-08-13 RX ORDER — POTASSIUM CHLORIDE 20 MEQ/1
20 TABLET, EXTENDED RELEASE ORAL
COMMUNITY
Start: 2020-08-03 | End: 2020-09-02

## 2020-08-13 RX ORDER — PANTOPRAZOLE SODIUM 40 MG/1
40 TABLET, DELAYED RELEASE ORAL DAILY
Qty: 30 TABLET | Refills: 1 | Status: SHIPPED | OUTPATIENT
Start: 2020-08-13 | End: 2020-10-12

## 2020-08-13 RX ORDER — SUCRALFATE 1 G/1
1 TABLET ORAL 4 TIMES DAILY
Qty: 40 TABLET | Refills: 1 | Status: SHIPPED | OUTPATIENT
Start: 2020-08-13

## 2020-09-27 ENCOUNTER — HOSPITAL ENCOUNTER (EMERGENCY)
Facility: HOSPITAL | Age: 40
Discharge: HOME | End: 2020-09-28
Attending: EMERGENCY MEDICINE
Payer: MEDICAID

## 2020-09-27 DIAGNOSIS — Z51.89 VISIT FOR WOUND CHECK: Primary | ICD-10-CM

## 2020-09-27 LAB
ALBUMIN SERPL BCP-MCNC: 3.8 G/DL (ref 3.5–5.2)
ALP SERPL-CCNC: 78 U/L (ref 55–135)
ALT SERPL W/O P-5'-P-CCNC: 16 U/L (ref 10–44)
ANION GAP SERPL CALC-SCNC: 8 MMOL/L (ref 8–16)
AST SERPL-CCNC: 23 U/L (ref 10–40)
BASOPHILS # BLD AUTO: 0.05 K/UL (ref 0–0.2)
BASOPHILS NFR BLD: 0.4 % (ref 0–1.9)
BILIRUB SERPL-MCNC: 0.2 MG/DL (ref 0.1–1)
BUN SERPL-MCNC: 8 MG/DL (ref 6–20)
CALCIUM SERPL-MCNC: 9 MG/DL (ref 8.7–10.5)
CHLORIDE SERPL-SCNC: 105 MMOL/L (ref 95–110)
CO2 SERPL-SCNC: 26 MMOL/L (ref 23–29)
CREAT SERPL-MCNC: 0.8 MG/DL (ref 0.5–1.4)
DIFFERENTIAL METHOD: ABNORMAL
EOSINOPHIL # BLD AUTO: 0.3 K/UL (ref 0–0.5)
EOSINOPHIL NFR BLD: 2.6 % (ref 0–8)
ERYTHROCYTE [DISTWIDTH] IN BLOOD BY AUTOMATED COUNT: 13.9 % (ref 11.5–14.5)
EST. GFR  (AFRICAN AMERICAN): >60 ML/MIN/1.73 M^2
EST. GFR  (NON AFRICAN AMERICAN): >60 ML/MIN/1.73 M^2
GLUCOSE SERPL-MCNC: 84 MG/DL (ref 70–110)
HCT VFR BLD AUTO: 33.8 % (ref 37–48.5)
HGB BLD-MCNC: 11.5 G/DL (ref 12–16)
IMM GRANULOCYTES # BLD AUTO: 0.03 K/UL (ref 0–0.04)
IMM GRANULOCYTES NFR BLD AUTO: 0.3 % (ref 0–0.5)
LYMPHOCYTES # BLD AUTO: 3.3 K/UL (ref 1–4.8)
LYMPHOCYTES NFR BLD: 28.5 % (ref 18–48)
MCH RBC QN AUTO: 30.9 PG (ref 27–31)
MCHC RBC AUTO-ENTMCNC: 34 G/DL (ref 32–36)
MCV RBC AUTO: 91 FL (ref 82–98)
MONOCYTES # BLD AUTO: 0.7 K/UL (ref 0.3–1)
MONOCYTES NFR BLD: 5.7 % (ref 4–15)
NEUTROPHILS # BLD AUTO: 7.2 K/UL (ref 1.8–7.7)
NEUTROPHILS NFR BLD: 62.5 % (ref 38–73)
NRBC BLD-RTO: 0 /100 WBC
PLATELET # BLD AUTO: 627 K/UL (ref 150–350)
PMV BLD AUTO: 9 FL (ref 9.2–12.9)
POTASSIUM SERPL-SCNC: 3.7 MMOL/L (ref 3.5–5.1)
PROT SERPL-MCNC: 7.8 G/DL (ref 6–8.4)
RBC # BLD AUTO: 3.72 M/UL (ref 4–5.4)
SODIUM SERPL-SCNC: 139 MMOL/L (ref 136–145)
WBC # BLD AUTO: 11.54 K/UL (ref 3.9–12.7)

## 2020-09-27 PROCEDURE — 85025 COMPLETE CBC W/AUTO DIFF WBC: CPT

## 2020-09-27 PROCEDURE — 99285 EMERGENCY DEPT VISIT HI MDM: CPT | Mod: 25

## 2020-09-27 PROCEDURE — 80053 COMPREHEN METABOLIC PANEL: CPT

## 2020-09-27 RX ADMIN — IOHEXOL 75 ML: 350 INJECTION, SOLUTION INTRAVENOUS at 11:09

## 2020-09-28 ENCOUNTER — TELEPHONE (OUTPATIENT)
Dept: NEUROLOGY | Facility: HOSPITAL | Age: 40
End: 2020-09-28

## 2020-09-28 VITALS
BODY MASS INDEX: 26.61 KG/M2 | WEIGHT: 135.56 LBS | DIASTOLIC BLOOD PRESSURE: 79 MMHG | OXYGEN SATURATION: 100 % | HEIGHT: 60 IN | RESPIRATION RATE: 16 BRPM | TEMPERATURE: 99 F | SYSTOLIC BLOOD PRESSURE: 126 MMHG | HEART RATE: 86 BPM

## 2020-09-28 PROCEDURE — 25500020 PHARM REV CODE 255: Performed by: EMERGENCY MEDICINE

## 2020-09-28 NOTE — TELEPHONE ENCOUNTER
----- Message from Ofelia Lindsay sent at 9/28/2020  8:47 AM CDT -----  Contact: self [phone]  SRINATH - Patient is calling because she had to go to the ED on yesterday and need to come see the doctor. Please call

## 2020-09-28 NOTE — ED NOTES
md at bedside to explain CT results. Pt verbalizes understanding of results and need for referral. Pt in no apparent distress.

## 2020-09-28 NOTE — ED NOTES
Review of patient's allergies indicates:   Allergen Reactions    Pcn [penicillins] Hives    Sulfa (sulfonamide antibiotics) Other (See Comments)     Pt reports she felt really weak        Patient has verified the spelling of their name and  on armband.   APPEARANCE: Patient is alert, calm, oriented x 4, and appears anxious.   SKIN: Skin is normal for race, warm, and dry. Normal skin turgor and mucous membranes moist.  CARDIAC: Normal rate and rhythm, no murmur heard.   RESPIRATORY:Normal rate and effort. Breath sounds clear bilaterally throughout chest. Respirations are equal and unlabored.    GASTRO: Bowel sounds normal, abdomen is soft, no tenderness, and no abdominal distention. Pt reports possible incision infection. Pt reports it started about two days ago. Pt reports she had an ulcer on her intestines rupture. Pt had surgery at the end of may. Site doesn't look visibly infected. Pt reports tenderness to touch. Pt denies any n/v/d  PERIPHERAL VASCULAR: peripheral pulses present. Normal cap refill. No edema. Warm to touch.  NEURO: 5/5 strength major flexors/extensors bilaterally. Sensory intact to light touch bilaterally. [place] coma scale: eyes open spontaneously-4, oriented & converses-5, obeys commands-6. No neurological abnormalities.   MENTAL STATUS: awake, alert and aware of environment.  : Voids without complication

## 2020-09-28 NOTE — ED PROVIDER NOTES
Encounter Date: 2020       History     Chief Complaint   Patient presents with    Wound Check     States incision from May looks infected to her.     This is a 40-year-old female with a history of perforated gastric ulcer status post splenectomy, who presents the ER for evaluation of worsening abdominal pain, decision a wound breakdown and concern for infection.  Onset yesterday when she was taking a bath she noted that her surgical scar looked more red, and painful to the touch.  She denied fever chills chest pain or shortness of breath.  She was concerning came to the ER for further evaluation.        Review of patient's allergies indicates:   Allergen Reactions    Pcn [penicillins] Hives    Sulfa (sulfonamide antibiotics) Other (See Comments)     Pt reports she felt really weak     Past Medical History:   Diagnosis Date    Anxiety     Cervical vertebral fusion     Depression      Past Surgical History:   Procedure Laterality Date    CARPAL TUNNEL RELEASE      left and right wrists    CERVICAL FUSION  2016     SECTION, CLASSIC      GASTRECTOMY  2020    Procedure: GASTRECTOMY;  Surgeon: Cristiano Alva MD;  Location: [facility] OR;  Service: General;;    INTRAUTERINE DEVICE INSERTION      SPLENECTOMY N/A 2020    Procedure: SPLENECTOMY;  Surgeon: Cristiano Alva MD;  Location: [facility] OR;  Service: General;  Laterality: N/A;     Family History   Problem Relation Age of Onset    No Known Problems Mother     No Known Problems Father     No Known Problems Sister     No Known Problems Brother     No Known Problems Daughter     No Known Problems Son     No Known Problems Maternal Aunt     No Known Problems Maternal Uncle     No Known Problems Paternal Aunt     No Known Problems Paternal Uncle     No Known Problems Maternal Grandmother     No Known Problems Maternal Grandfather     No Known Problems Paternal Grandmother     No Known Problems Paternal Grandfather       Social History     Tobacco Use    Smoking status: Current Every Day Smoker     Packs/day: 1.00     Years: 22.00     Pack years: 22.00     Types: Cigarettes     Start date: 1998    Smokeless tobacco: Never Used    Tobacco comment: Ambulatory referral to Smoking Cessation program.    Substance Use Topics    Alcohol use: No    Drug use: Yes     Types: Marijuana     Review of Systems   Constitutional: Negative for fatigue and fever.   Respiratory: Negative for shortness of breath.    Cardiovascular: Negative for chest pain.   Gastrointestinal: Positive for abdominal pain. Negative for nausea.   Skin: Positive for wound.   All other systems reviewed and are negative.      Physical Exam     Initial Vitals [09/27/20 2119]   BP Pulse Resp Temp SpO2   (!) 145/82 95 18 98.2 °F (36.8 °C) 100 %      MAP       --         Physical Exam    Constitutional: She appears well-developed and well-nourished. She is not diaphoretic. No distress.   HENT:   Head: Normocephalic and atraumatic.   Eyes: Pupils are equal, round, and reactive to light.   Neck: Normal range of motion.   Cardiovascular: Normal rate and regular rhythm.   Pulmonary/Chest: Breath sounds normal.   Abdominal: Soft.   Post open abdominal laparotomy noted, there is granulation tissue with some breakdown of the inferior portion of the wound, pain to palpation, no abscess or obvious dehiscence   Neurological: She is alert and oriented to person, place, and time. GCS score is 15. GCS eye subscore is 4. GCS verbal subscore is 5. GCS motor subscore is 6.   Skin: Skin is warm and dry. Capillary refill takes less than 2 seconds.         ED Course   Procedures  Labs Reviewed   CBC W/ AUTO DIFFERENTIAL - Abnormal; Notable for the following components:       Result Value    RBC 3.72 (*)     Hemoglobin 11.5 (*)     Hematocrit 33.8 (*)     Platelets 627 (*)     MPV 9.0 (*)     All other components within normal limits   COMPREHENSIVE METABOLIC PANEL          Imaging Results            CT Abdomen Pelvis With Contrast (Final result)  Result time 09/27/20 23:42:42    Final result by Constantino Rai MD (09/27/20 23:42:42)                 Impression:      1. Since the 08/10/2020 CT, interval development of multiple fluid collections in the anterior abdominal wall extending along the surgical site, as described.  The most cephalad of these collections demonstrates foci of gas and extends to the skin surface.  2. While some of these collections marginate the anterior border of the rectus musculature, there is no definite intramuscular abscess appreciated by CT.  Additionally, there is no convincing evidence of intra-abdominal extension of these collections.  3. Elsewhere, the postoperative appearance of the abdomen and pelvis is unchanged relative to the prior study.  This report was flagged in Epic as abnormal.      Electronically signed by: Constantino Rai  Date:    09/27/2020  Time:    23:42             Narrative:    EXAMINATION:  CT ABDOMEN PELVIS WITH CONTRAST    CLINICAL HISTORY:  Abdominal infection suspected;Wound breakdown from surgical incision, concern for infection;    TECHNIQUE:  Low dose axial images, sagittal and coronal reformations were obtained from the lung bases to the pubic symphysis following the IV administration of 75 mL of Omnipaque 350    COMPARISON:  CT abdomen pelvis performed 08/10/2020    FINDINGS:  Lower chest: Unremarkable.    Liver: Unremarkable.    Gallbladder and bile ducts: Unremarkable. No biliary ductal dilatation.    Pancreas: Unremarkable.    Spleen: Surgically absent.  Small residual accessory splenule.    Adrenals: Unremarkable.    Kidneys: Nonobstructing nephrolithiasis is again noted.    Lymph nodes: No abdominal or pelvic lymphadenopathy.    Bowel and mesentery: Redemonstrated prior operative changes of partial gastrectomy and Billroth 2 reconstruction.A normal appearing appendix is identified.    Abdominal aorta: Atherosclerosis.    Inferior vena  cava: Unremarkable.    Free fluid or free air: None.    Pelvis: The uterus and bilateral ovaries are identified.  Bladder is decompressed.    Body wall: Redemonstrated are soft tissue induration in the anterior midline abdomen related to prior surgery.  Since the prior study, there has been development of multiple fluid collections in the anterior abdominal wall extending along the surgical tracks.    The most cephalad of these collections is identified approximately 4.5 cm below the level of the inferior aspect of the sternum in the anterior midline abdomen; this collection demonstrates foci of fluid and gas extends to the skin surface (series 2, image 43; sagittal reformat series 602, image 87).  Overall the collection measures approximately 3.1 x 1.2 by 3.8 cm (AP, mL, cc).  Least 4 additional small round to ovoid collections are noted in the anterior abdominal wall along the surgical site, as appreciated on sagittal reformat series 602, image 86 and 84).  These collections are not definitively contiguous with 1 another.  While some of these collections margin of the anterior margin of the rectus abdominus musculature, there is no definite intramuscular abscess.  There is no intra-abdominal extension of these collections.    Bones: Unremarkable.                                 Medical Decision Making:   History:   Old Medical Records: I decided to obtain old medical records.  Old Records Summarized: records from clinic visits.       <> Summary of Records: 08/13 - PT presented to Dr. Alva with melena s/p antrectomy with Billroth II reconstruction and splenectomy   Initial Assessment:   32-year-old female presents the ER for evaluation of wound breakdown.  Had a perforated gastric ulcer in May requiring repair.  Reported yesterday worsening abdominal pain and wound breakdown in the site which concerned her.  No fever chills chest pain or shortness of breath.  Will obtain blood work and will reassess.    Differential Diagnosis:   Incisional scar pain, postop infection, seroma, wound dehiscence, abscess                   ED Course as of Sep 28 0043   Mon Sep 28, 2020   0009 Patient resting comfortably in bed, no acute distress.  Labs imaging reviewed.  No leukocytosis noted.  Abnormal CT finding noted.  Discussed with surgical resident on-call.  Discussed CT, CT reviewed by surgery team.  No acute concerns per surgery.  Okay to follow-up in office.  Will give referrals.  Patient has no other complaints.  Discussed with patient.  Return precautions discussed.  Will give number for surgeon to call.  Patient will be discharged.    [SE]      ED Course User Index  [SE] Vaibhav Rose MD            Clinical Impression:     ICD-10-CM ICD-9-CM   1. Visit for wound check  Z51.89 V58.89                      Disposition:   Disposition: Discharged  Condition: Stable     ED Disposition Condition    Discharge Stable        ED Prescriptions     None        Follow-up Information     Follow up With Specialties Details Why Contact Info    Cristiano Alva MD General Surgery Call in 1 day  [address]  [address]  [facility] [address]  [phone]                                         Vaibhav Rose MD  09/28/20 0045

## 2020-09-28 NOTE — DISCHARGE INSTRUCTIONS
Please call surgical team.  Informed that you were seen and evaluated in the ER last night.  Your CT revealed multiple fluid collections in the anterior abdominal wall.  There is no acute source of infection.  Your discussed with surgical team the advised to follow-up in the office this week.  Return to the ER for any concerning reason.

## 2020-10-01 ENCOUNTER — OFFICE VISIT (OUTPATIENT)
Dept: NEUROLOGY | Facility: HOSPITAL | Age: 40
End: 2020-10-01
Attending: SURGERY
Payer: MEDICAID

## 2020-10-01 VITALS
BODY MASS INDEX: 27.4 KG/M2 | HEIGHT: 60 IN | SYSTOLIC BLOOD PRESSURE: 122 MMHG | WEIGHT: 139.56 LBS | DIASTOLIC BLOOD PRESSURE: 71 MMHG | HEART RATE: 94 BPM | TEMPERATURE: 99 F

## 2020-10-01 DIAGNOSIS — T81.89XA GRANULOMA OF SURGICAL WOUND, INITIAL ENCOUNTER: Primary | ICD-10-CM

## 2020-10-01 PROCEDURE — 99215 OFFICE O/P EST HI 40 MIN: CPT | Performed by: SURGERY

## 2020-10-01 RX ORDER — POTASSIUM CHLORIDE 20 MEQ/1
TABLET, EXTENDED RELEASE ORAL
COMMUNITY

## 2020-10-01 RX ORDER — CLINDAMYCIN PHOSPHATE 900 MG/50ML
900 INJECTION, SOLUTION INTRAVENOUS
Status: CANCELLED | OUTPATIENT
Start: 2020-10-01

## 2020-10-01 RX ORDER — HYDROXYZINE HYDROCHLORIDE 50 MG/1
TABLET, FILM COATED ORAL
COMMUNITY

## 2020-10-01 NOTE — PROGRESS NOTES
NOLANETS:  [facility]  A collaboration between [facility] and [facility]      PATIENT: Zandra Eckert  MRN: [id number]  DATE: 10/1/2020    Subjective:      Chief Complaint: wound check    Complaints of pain and discharge from the wound.  She was doing well eating well having normal bowel function with no pain at all.  She certainly noticed pain and increasing pressure she went to the emergency room 3 days back and she underwent labs and CT scan.  The next day after the ER visit she started have discharge from the wound spontaneously.    Vitals: Blood pressure 122/71, pulse 94, temperature 99 °F (37.2 °C), height 5' (1.524 m), weight 63.3 kg (139 lb 8.8 oz). }    ECOG Score: 1 - Symptomatic but completely ambulatory    Diagnosis: No diagnosis found.     Interval History:    2020  39 yoF recently admitted for perforated ulcer who underwent antrectomy with Billroth II reconstruction and splenectomy She was discharged to inpatient psych facility, however for the last 3 days she has noticed melanotic stools. She describes them as black and sticky. No maroon or red blood. Has never had episodes like this in the past. No n/v. No food intolerance. No fevers. No light headedness or feeling faint.    Oncologic History:   Oncologic History    Oncologic Treatment    Pathology      Past Medical History:  Past Medical History:   Diagnosis Date    Anxiety     Cervical vertebral fusion     Depression        Past Surgical History:  Past Surgical History:   Procedure Laterality Date    CARPAL TUNNEL RELEASE      left and right wrists    CERVICAL FUSION  2016     SECTION, CLASSIC      GASTRECTOMY  2020    Procedure: GASTRECTOMY;  Surgeon: Cristiano Alva MD;  Location: [facility] OR;  Service: General;;    INTRAUTERINE DEVICE INSERTION      SPLENECTOMY N/A 2020    Procedure: SPLENECTOMY;  Surgeon:  Cristiano Alva MD;  Location: [facility] OR;  Service: General;  Laterality: N/A;       Family History:  Family History   Problem Relation Age of Onset    No Known Problems Mother     No Known Problems Father     No Known Problems Sister     No Known Problems Brother     No Known Problems Daughter     No Known Problems Son     No Known Problems Maternal Aunt     No Known Problems Maternal Uncle     No Known Problems Paternal Aunt     No Known Problems Paternal Uncle     No Known Problems Maternal Grandmother     No Known Problems Maternal Grandfather     No Known Problems Paternal Grandmother     No Known Problems Paternal Grandfather        Allergies:  Pcn [penicillins] and Sulfa (sulfonamide antibiotics)    Medications:   Current Outpatient Medications   Medication Sig    dextroamphetamine-amphetamine (ADDERALL XR) 20 MG 24 hr capsule Take 20 mg by mouth.    escitalopram oxalate (LEXAPRO) 10 MG tablet Take 10 mg by mouth once daily.    metoclopramide HCl (REGLAN) 10 MG tablet Take 1 tablet (10 mg total) by mouth 4 (four) times daily.    multivitamin capsule Take 1 capsule by mouth.    pantoprazole (PROTONIX) 40 MG tablet Take 1 tablet (40 mg total) by mouth once daily.    senna-docusate 8.6-50 mg (PERICOLACE) 8.6-50 mg per tablet Take 1 tablet by mouth 2 (two) times daily.    sucralfate (CARAFATE) 1 gram tablet Take 1 tablet (1 g total) by mouth 4 (four) times daily.    hydrOXYzine (ATARAX) 50 MG tablet hydroxyzine HCl 50 mg tablet   TAKE ONE TABLET BY MOUTH FOUR TIMES DAILY AS NEEDED    oxyCODONE-acetaminophen (PERCOCET) 5-325 mg per tablet Take 1 tablet by mouth every 6 (six) hours as needed for Pain. (Patient not taking: Reported on 8/13/2020)    potassium chloride SA (K-DUR,KLOR-CON) 20 MEQ tablet potassium chloride ER 20 mEq tablet,extended release(part/cryst)   TAKE ONE TABLET BY MOUTH TWICE DAILY     No current facility-administered medications for this visit.         Review of  Systems   Constitutional: Negative for activity change, appetite change, chills, fever and unexpected weight change.   HENT: Negative for congestion, dental problem, drooling, ear discharge, ear pain, facial swelling, nosebleeds, postnasal drip, sinus pressure, sore throat and trouble swallowing.    Eyes: Negative for photophobia, pain, discharge, redness, itching and visual disturbance.   Respiratory: Negative for cough, choking, chest tightness, wheezing and stridor.    Cardiovascular: Negative for chest pain, palpitations and leg swelling.   Gastrointestinal: Positive for abdominal distention and abdominal pain. Negative for anal bleeding, constipation, diarrhea, rectal pain and vomiting.   Endocrine: Negative.    Genitourinary: Negative for decreased urine volume, dysuria, frequency, genital sores and menstrual problem.   Musculoskeletal: Negative for back pain, gait problem, myalgias, neck pain and neck stiffness.   Skin: Positive for color change, rash and wound.   Allergic/Immunologic: Negative.    Neurological: Negative for tremors, seizures, speech difficulty, weakness, light-headedness and numbness.   Hematological: Negative for adenopathy. Does not bruise/bleed easily.   Psychiatric/Behavioral: Negative for decreased concentration and dysphoric mood.      Objective:      Physical Exam  Constitutional:       Appearance: She is normal weight.   HENT:      Head: Normocephalic.      Nose: Nose normal.   Eyes:      Pupils: Pupils are equal, round, and reactive to light.   Neck:      Musculoskeletal: No neck rigidity or muscular tenderness.      Vascular: No carotid bruit.   Cardiovascular:      Rate and Rhythm: Normal rate and regular rhythm.      Pulses: Normal pulses.      Heart sounds: Normal heart sounds.   Pulmonary:      Effort: Pulmonary effort is normal.   Abdominal:      General: Abdomen is flat. Bowel sounds are normal.      Palpations: There is no mass.      Tenderness: There is no abdominal  tenderness. There is no guarding or rebound.      Hernia: No hernia is present.      Comments: .  Patient would not let me  examine probe the wound.      Two areas at the site of incision 1 at the top 1 close to the umbilicus with granuloma no active discharge at this point no significant underneath fluid collection   Musculoskeletal: Normal range of motion.   Lymphadenopathy:      Cervical: No cervical adenopathy.   Skin:     General: Skin is warm and dry.   Neurological:      General: No focal deficit present.      Mental Status: She is alert and oriented to person, place, and time.   Psychiatric:         Mood and Affect: Mood normal.        Assessment:       No diagnosis found.    Laboratory Data:   Results for VALERIANO HUYNH (MRN [id number]) as of 10/2/2020 20:14   Ref. Range 8/10/2020 22:34 9/27/2020 22:04   WBC Latest Ref Range: 3.90 - 12.70 K/uL  11.54   RBC Latest Ref Range: 4.00 - 5.40 M/uL  3.72 (L)   Hemoglobin Latest Ref Range: 12.0 - 16.0 g/dL  11.5 (L)   Hematocrit Latest Ref Range: 37.0 - 48.5 %  33.8 (L)   MCV Latest Ref Range: 82 - 98 fL  91   MCH Latest Ref Range: 27.0 - 31.0 pg  30.9   MCHC Latest Ref Range: 32.0 - 36.0 g/dL  34.0   RDW Latest Ref Range: 11.5 - 14.5 %  13.9   Platelets Latest Ref Range: 150 - 350 K/uL  627 (H)   MPV Latest Ref Range: 9.2 - 12.9 fL  9.0 (L)   Gran% Latest Ref Range: 38.0 - 73.0 %  62.5   Lymph% Latest Ref Range: 18.0 - 48.0 %  28.5   Mono% Latest Ref Range: 4.0 - 15.0 %  5.7   Eosinophil% Latest Ref Range: 0.0 - 8.0 %  2.6   Basophil% Latest Ref Range: 0.0 - 1.9 %  0.4   Immature Granulocytes Latest Ref Range: 0.0 - 0.5 %  0.3   Gran # (ANC) Latest Ref Range: 1.8 - 7.7 K/uL  7.2   Lymph # Latest Ref Range: 1.0 - 4.8 K/uL  3.3   Mono # Latest Ref Range: 0.3 - 1.0 K/uL  0.7   Eos # Latest Ref Range: 0.0 - 0.5 K/uL  0.3   Baso # Latest Ref Range: 0.00 - 0.20 K/uL  0.05   Immature Grans (Abs) Latest Ref Range: 0.00 - 0.04 K/uL  0.03   nRBC Latest Ref Range: 0 /100 WBC   0   Differential Method Unknown  Automated   Sodium Latest Ref Range: 136 - 145 mmol/L  139   Potassium Latest Ref Range: 3.5 - 5.1 mmol/L  3.7   Chloride Latest Ref Range: 95 - 110 mmol/L  105   CO2 Latest Ref Range: 23 - 29 mmol/L  26   Anion Gap Latest Ref Range: 8 - 16 mmol/L  8   BUN, Bld Latest Ref Range: 6 - 20 mg/dL  8   Creatinine Latest Ref Range: 0.5 - 1.4 mg/dL  0.8   eGFR if non African American Latest Ref Range: >60 mL/min/1.73 m^2  >60   eGFR if  Latest Ref Range: >60 mL/min/1.73 m^2  >60   Glucose Latest Ref Range: 70 - 110 mg/dL  84   Calcium Latest Ref Range: 8.7 - 10.5 mg/dL  9.0   Alkaline Phosphatase Latest Ref Range: 55 - 135 U/L  78   PROTEIN TOTAL Latest Ref Range: 6.0 - 8.4 g/dL  7.8   Albumin Latest Ref Range: 3.5 - 5.2 g/dL  3.8   BILIRUBIN TOTAL Latest Ref Range: 0.1 - 1.0 mg/dL  0.2   AST Latest Ref Range: 10 - 40 U/L  23   ALT Latest Ref Range: 10 - 44 U/L  16   Specimen UA Unknown Urine, Clean Catch    Color, UA Latest Ref Range: Yellow, Straw, Susan  Yellow    Appearance, UA Latest Ref Range: Clear  Clear    Specific [place], UA Latest Ref Range: 1.005 - 1.030  1.025    pH, UA Latest Ref Range: 5.0 - 8.0  7.0    Protein, UA Latest Ref Range: Negative  Negative    Glucose, UA Latest Ref Range: Negative  Negative    Ketones, UA Latest Ref Range: Negative  Negative    Occult Blood UA Latest Ref Range: Negative  2+ (A)    NITRITE UA Latest Ref Range: Negative  Negative    UROBILINOGEN UA Latest Ref Range: <2.0 EU/dL Negative    Bilirubin (UA) Latest Ref Range: Negative  Negative    Leukocytes, UA Latest Ref Range: Negative  Negative    RBC, UA Latest Ref Range: 0 - 4 /hpf 20 (H)    WBC, UA Latest Ref Range: 0 - 5 /hpf 1    Bacteria, UA Latest Ref Range: None-Occ /hpf Few (A)    Microscopic Comment Unknown SEE COMMENT        Scans:   CT Abdomen Pelvis With Contrast  Narrative: EXAMINATION:  CT ABDOMEN PELVIS WITH CONTRAST    CLINICAL HISTORY:  Abdominal infection  suspected;Wound breakdown from surgical incision, concern for infection;    TECHNIQUE:  Low dose axial images, sagittal and coronal reformations were obtained from the lung bases to the pubic symphysis following the IV administration of 75 mL of Omnipaque 350    COMPARISON:  CT abdomen pelvis performed 08/10/2020    FINDINGS:  Lower chest: Unremarkable.    Liver: Unremarkable.    Gallbladder and bile ducts: Unremarkable. No biliary ductal dilatation.    Pancreas: Unremarkable.    Spleen: Surgically absent.  Small residual accessory splenule.    Adrenals: Unremarkable.    Kidneys: Nonobstructing nephrolithiasis is again noted.    Lymph nodes: No abdominal or pelvic lymphadenopathy.    Bowel and mesentery: Redemonstrated prior operative changes of partial gastrectomy and Billroth 2 reconstruction.A normal appearing appendix is identified.    Abdominal aorta: Atherosclerosis.    Inferior vena cava: Unremarkable.    Free fluid or free air: None.    Pelvis: The uterus and bilateral ovaries are identified.  Bladder is decompressed.    Body wall: Redemonstrated are soft tissue induration in the anterior midline abdomen related to prior surgery.  Since the prior study, there has been development of multiple fluid collections in the anterior abdominal wall extending along the surgical tracks.    The most cephalad of these collections is identified approximately 4.5 cm below the level of the inferior aspect of the sternum in the anterior midline abdomen; this collection demonstrates foci of fluid and gas extends to the skin surface (series 2, image 43; sagittal reformat series 602, image 87).  Overall the collection measures approximately 3.1 x 1.2 by 3.8 cm (AP, mL, cc).  Least 4 additional small round to ovoid collections are noted in the anterior abdominal wall along the surgical site, as appreciated on sagittal reformat series 602, image 86 and 84).  These collections are not definitively contiguous with 1 another.  While  some of these collections margin of the anterior margin of the rectus abdominus musculature, there is no definite intramuscular abscess.  There is no intra-abdominal extension of these collections.    Bones: Unremarkable.  Impression: 1. Since the 08/10/2020 CT, interval development of multiple fluid collections in the anterior abdominal wall extending along the surgical site, as described.  The most cephalad of these collections demonstrates foci of gas and extends to the skin surface.  2. While some of these collections marginate the anterior border of the rectus musculature, there is no definite intramuscular abscess appreciated by CT.  Additionally, there is no convincing evidence of intra-abdominal extension of these collections.  3. Elsewhere, the postoperative appearance of the abdomen and pelvis is unchanged relative to the prior study.  This report was flagged in Epic as abnormal.    Electronically signed by: Constantino Rai  Date:    09/27/2020  Time:    23:42       Impression:  40-year-old female who underwent partial gastrectomy and Errol-en-Y gastrojejunostomy sometime back was doing well she started have pain and discharge at the incision site.  She was closed with PDS.        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  T surgery she has no abdominal complaints otherwise.  discussed with her the benefits and risks were explained and consent was obtained plan will be to proceed with OR washout of the wound to address these granulomas at these 2 areas.  She has done well after this his was explore the wound in the OR with sedation          PEPE Quinonez MD, FACS   Associate Professor of Surgery, [facility]   Neuroendocrine Surgery, Hepatic/Pancreatic & General Surgery   [address]   [place] [address]   ph. [phone]; [phone]   fax. [phone]   Statement Selected

## 2020-10-01 NOTE — PATIENT INSTRUCTIONS
10/2/20 you have your procedure.    Pre op services will call you with the time to be here for procedure

## 2020-10-02 ENCOUNTER — PATIENT MESSAGE (OUTPATIENT)
Dept: NEUROLOGY | Facility: HOSPITAL | Age: 40
End: 2020-10-02

## 2020-10-02 ENCOUNTER — ANESTHESIA (OUTPATIENT)
Dept: SURGERY | Facility: HOSPITAL | Age: 40
End: 2020-10-02

## 2020-10-02 ENCOUNTER — ANESTHESIA EVENT (OUTPATIENT)
Dept: SURGERY | Facility: HOSPITAL | Age: 40
End: 2020-10-02

## 2020-10-02 NOTE — ANESTHESIA PREPROCEDURE EVALUATION
10/02/2020  Zandra Perlaterraia is a 40 y.o., female.    Pre-op Assessment          Review of Systems

## 2020-10-05 ENCOUNTER — TELEPHONE (OUTPATIENT)
Dept: NEUROLOGY | Facility: HOSPITAL | Age: 40
End: 2020-10-05

## 2020-10-05 ENCOUNTER — PATIENT MESSAGE (OUTPATIENT)
Dept: NEUROLOGY | Facility: HOSPITAL | Age: 40
End: 2020-10-05

## 2020-10-05 NOTE — TELEPHONE ENCOUNTER
Told pt that I would tell Dr Ojeda to check to see when he can put her on surgery schedule for wound. Pt verbalized understanding

## 2020-10-05 NOTE — TELEPHONE ENCOUNTER
----- Message from Patricia Vallejo sent at 10/5/2020  3:56 PM CDT -----  Contact: [phone]  SRINATH----Pt Zandra Eckert calling regarding P needing to speak with the Office.      Please call

## 2020-10-06 ENCOUNTER — PATIENT MESSAGE (OUTPATIENT)
Dept: NEUROLOGY | Facility: HOSPITAL | Age: 40
End: 2020-10-06

## 2020-10-08 ENCOUNTER — PATIENT MESSAGE (OUTPATIENT)
Dept: NEUROLOGY | Facility: HOSPITAL | Age: 40
End: 2020-10-08

## 2020-10-09 ENCOUNTER — TELEPHONE (OUTPATIENT)
Dept: NEUROLOGY | Facility: HOSPITAL | Age: 40
End: 2020-10-09

## 2020-10-10 ENCOUNTER — HOSPITAL ENCOUNTER (EMERGENCY)
Facility: HOSPITAL | Age: 40
Discharge: HOME | End: 2020-10-10
Attending: EMERGENCY MEDICINE
Payer: MEDICAID

## 2020-10-10 VITALS
SYSTOLIC BLOOD PRESSURE: 135 MMHG | RESPIRATION RATE: 18 BRPM | DIASTOLIC BLOOD PRESSURE: 60 MMHG | HEIGHT: 60 IN | HEART RATE: 81 BPM | BODY MASS INDEX: 26.41 KG/M2 | TEMPERATURE: 98 F | WEIGHT: 134.5 LBS | OXYGEN SATURATION: 100 %

## 2020-10-10 DIAGNOSIS — Z51.89 ENCOUNTER FOR WOUND RE-CHECK: Primary | ICD-10-CM

## 2020-10-10 PROCEDURE — 25000003 PHARM REV CODE 250: Performed by: EMERGENCY MEDICINE

## 2020-10-10 PROCEDURE — 99283 EMERGENCY DEPT VISIT LOW MDM: CPT

## 2020-10-10 RX ORDER — HYDROCODONE BITARTRATE AND ACETAMINOPHEN 5; 325 MG/1; MG/1
1 TABLET ORAL
Status: COMPLETED | OUTPATIENT
Start: 2020-10-10 | End: 2020-10-10

## 2020-10-10 RX ORDER — HYDROCODONE BITARTRATE AND ACETAMINOPHEN 5; 325 MG/1; MG/1
1 TABLET ORAL EVERY 4 HOURS PRN
Qty: 5 TABLET | Refills: 0 | Status: SHIPPED | OUTPATIENT
Start: 2020-10-10

## 2020-10-10 RX ADMIN — HYDROCODONE BITARTRATE AND ACETAMINOPHEN 1 TABLET: 5; 325 TABLET ORAL at 10:10

## 2020-10-11 NOTE — ED NOTES
Patient identifiers for Zandra Eckert checked and correct.  LOC: The patient is awake, alert and aware of environment with an appropriate affect, the patient is oriented x 3 and speaking appropriately.  APPEARANCE: Patient resting comfortably and in no acute distress, patient is clean and well groomed, patient's clothing are properly fastened.  SKIN: The skin is warm and dry, patient has normal skin turgor and moist mucus membranes.  Incision has three small areas with drainage noted.  MUSKULOSKELETAL: Patient moving all extremities well, no obvious swelling or deformities noted.  RESPIRATORY: Airway is open and patent, respirations are spontaneous, patient has a normal effort and rate.

## 2020-10-11 NOTE — ED PROVIDER NOTES
"Encounter Date: 10/10/2020    SCRIBE #1 NOTE: I, Gale Kirby, am scribing for, and in the presence of,  Dr. Lefort. I have scribed the entire note.       History     Chief Complaint   Patient presents with    Wound Check     Pt. c/o drainage from surgical site that began two weeks ago. Pt. denies c/o fever at home. Pt. had abdominal surgery in May for a ruptured ulcer.      Zandra Eckert is a 40 y.o. female with a past medical history of anxiety, cervical vertebral fusion, and depression who presents to the ED due to surgical site pain S/P removal of gastric ulcer in May of this year. Patient states pain began 2 weeks ago. Associated symptoms include fever and drainage from site. She also reports "pieces of plastic" found coming out of surgical site. Of note, patient missed follow-up appointment with Dr. Alva on 10/02/20 due to oversleeping. No prior treatment reported.     The history is provided by the patient.     Review of patient's allergies indicates:   Allergen Reactions    Pcn [penicillins] Hives    Sulfa (sulfonamide antibiotics) Other (See Comments)     Pt reports she felt really weak     Past Medical History:   Diagnosis Date    Anxiety     Cervical vertebral fusion     Depression      Past Surgical History:   Procedure Laterality Date    CARPAL TUNNEL RELEASE      left and right wrists    CERVICAL FUSION  2016     SECTION, CLASSIC      GASTRECTOMY  2020    Procedure: GASTRECTOMY;  Surgeon: Cristiano Alva MD;  Location: [facility] OR;  Service: General;;    INTRAUTERINE DEVICE INSERTION      SPLENECTOMY N/A 2020    Procedure: SPLENECTOMY;  Surgeon: Cristiano Alva MD;  Location: [facility] OR;  Service: General;  Laterality: N/A;     Family History   Problem Relation Age of Onset    No Known Problems Mother     No Known Problems Father     No Known Problems Sister     No Known Problems Brother     No Known Problems Daughter     No Known Problems Son "     No Known Problems Maternal Aunt     No Known Problems Maternal Uncle     No Known Problems Paternal Aunt     No Known Problems Paternal Uncle     No Known Problems Maternal Grandmother     No Known Problems Maternal Grandfather     No Known Problems Paternal Grandmother     No Known Problems Paternal Grandfather      Social History     Tobacco Use    Smoking status: Current Every Day Smoker     Packs/day: 1.00     Years: 22.00     Pack years: 22.00     Types: Cigarettes     Start date: 1998    Smokeless tobacco: Never Used    Tobacco comment: Ambulatory referral to Smoking Cessation program.    Substance Use Topics    Alcohol use: No    Drug use: Yes     Types: Marijuana     Review of Systems   Constitutional: Positive for fever.   HENT: Negative for sore throat.    Respiratory: Negative for shortness of breath.    Cardiovascular: Negative for chest pain.   Gastrointestinal: Negative for nausea.   Genitourinary: Negative for dysuria.   Musculoskeletal: Negative for back pain.   Skin: Negative for rash.        + Surgical site pain  + Drainage from surgical site   Neurological: Negative for weakness.   Hematological: Does not bruise/bleed easily.       Physical Exam     Initial Vitals [10/10/20 2122]   BP Pulse Resp Temp SpO2   (!) 145/90 82 16 99 °F (37.2 °C) 98 %      MAP       --         Physical Exam    Nursing note and vitals reviewed.  Constitutional: She appears well-developed and well-nourished. She is not diaphoretic. No distress.   HENT:   Head: Normocephalic and atraumatic.   Mouth/Throat: Oropharynx is clear and moist.   Eyes: Conjunctivae and EOM are normal. Pupils are equal, round, and reactive to light.   Neck: Normal range of motion. Neck supple.   Cardiovascular: Normal rate, regular rhythm, normal heart sounds and intact distal pulses.   Pulmonary/Chest: Breath sounds normal. No respiratory distress.   Abdominal: Soft. Bowel sounds are normal. She exhibits no distension. There is no  abdominal tenderness.   Musculoskeletal: Normal range of motion. No tenderness or edema.   Neurological: She is alert and oriented to person, place, and time. She has normal strength.   Skin: Skin is warm and dry. No erythema.   Ventral surgical site, mostly healed, with 2 punctate areas. Regulation tissue and scant purulent drainage noted.    Psychiatric: She has a normal mood and affect. Her behavior is normal. Thought content normal.         ED Course   Procedures  Labs Reviewed - No data to display       Imaging Results    None          Medical Decision Making:   History:   Old Medical Records: I decided to obtain old medical records.  Initial Assessment:   Zandra Eckert is a 40 y.o. female with a past medical history of anxiety, cervical vertebral fusion, and depression who presents to the ED due to surgical site pain S/P removal of gastric ulcer in May of this year. The patient was seen and examined. The history and physical exam was obtained. The nursing notes and vital signs were reviewed.   ED Management:  21:50  Spoke with surgical resident who is familiar with case. Stated they will call patient Monday to reschedule follow-up appointment, no need for further workup. Discussed plan with patient.             Scribe Attestation:   Scribe #1: I performed the above scribed service and the documentation accurately describes the services I performed. I attest to the accuracy of the note.                      Clinical Impression:     ICD-10-CM ICD-9-CM   1. Encounter for wound re-check  Z51.89 V58.89                      Disposition:   Disposition: Discharged  Condition: Stable     ED Disposition Condition    Discharge Stable        ED Prescriptions     Medication Sig Dispense Start Date End Date Auth. Provider    HYDROcodone-acetaminophen (NORCO) 5-325 mg per tablet Take 1 tablet by mouth every 4 (four) hours as needed for Pain. 5 tablet 10/10/2020  Guy J. Lefort, MD        Follow-up Information     Follow  up With Specialties Details Why Contact Info    [facility]  If symptoms worsen or any other concerns [address]  [facility] [id number] [phone]    Cristiano Alva MD General Surgery Go on 10/12/2020  [address]  [address]  [place] [address]  [phone]                          Scribe attestation I, Dr. Guy Lefort, personally performed the services described in this documentation. All medical record entries made by the scribe were at my direction and in my presence. I have reviewed the chart and agree that the record reflects my personal performance and is accurate and complete. Guy Lefort, MD.  12:12 AM 10/13/2020                 Guy J. Lefort, MD  10/13/20 0012

## 2020-10-12 ENCOUNTER — TELEPHONE (OUTPATIENT)
Dept: NEUROLOGY | Facility: HOSPITAL | Age: 40
End: 2020-10-12

## 2020-10-12 DIAGNOSIS — Z01.818 PREOP TESTING: Primary | ICD-10-CM

## 2020-10-13 ENCOUNTER — LAB VISIT (OUTPATIENT)
Dept: FAMILY MEDICINE | Facility: CLINIC | Age: 40
End: 2020-10-13
Payer: MEDICAID

## 2020-10-13 ENCOUNTER — TELEPHONE (OUTPATIENT)
Dept: SURGERY | Facility: HOSPITAL | Age: 40
End: 2020-10-13

## 2020-10-13 DIAGNOSIS — Z01.818 PREOP TESTING: ICD-10-CM

## 2020-10-13 PROCEDURE — U0003 INFECTIOUS AGENT DETECTION BY NUCLEIC ACID (DNA OR RNA); SEVERE ACUTE RESPIRATORY SYNDROME CORONAVIRUS 2 (SARS-COV-2) (CORONAVIRUS DISEASE [COVID-19]), AMPLIFIED PROBE TECHNIQUE, MAKING USE OF HIGH THROUGHPUT TECHNOLOGIES AS DESCRIBED BY CMS-2020-01-R: HCPCS

## 2020-10-14 LAB — SARS-COV-2 RNA RESP QL NAA+PROBE: NOT DETECTED

## 2020-10-22 ENCOUNTER — PATIENT MESSAGE (OUTPATIENT)
Dept: NEUROLOGY | Facility: HOSPITAL | Age: 40
End: 2020-10-22

## 2020-10-28 ENCOUNTER — PATIENT MESSAGE (OUTPATIENT)
Dept: NEUROLOGY | Facility: HOSPITAL | Age: 40
End: 2020-10-28

## 2021-01-01 ENCOUNTER — PATIENT MESSAGE (OUTPATIENT)
Dept: RESEARCH | Facility: HOSPITAL | Age: 41
End: 2021-01-01

## 2021-03-22 NOTE — PLAN OF CARE
Patient on room air with documented SATS and in no apparent distress. Will continue to monitor.     Adult

## 2022-01-01 ENCOUNTER — HOSPITAL ENCOUNTER (EMERGENCY)
Facility: HOSPITAL | Age: 42
End: 2022-03-24
Attending: EMERGENCY MEDICINE
Payer: MEDICAID

## 2022-01-01 VITALS — RESPIRATION RATE: 30 BRPM | SYSTOLIC BLOOD PRESSURE: 156 MMHG | DIASTOLIC BLOOD PRESSURE: 65 MMHG | HEART RATE: 124 BPM

## 2022-01-01 DIAGNOSIS — R41.82 AMS (ALTERED MENTAL STATUS): ICD-10-CM

## 2022-01-01 DIAGNOSIS — Z00.8 MEDICAL CLEARANCE FOR PSYCHIATRIC ADMISSION: ICD-10-CM

## 2022-01-01 PROCEDURE — 31500 INSERT EMERGENCY AIRWAY: CPT

## 2022-01-01 PROCEDURE — 99291 CRITICAL CARE FIRST HOUR: CPT | Mod: 25

## 2022-01-01 PROCEDURE — 25000003 PHARM REV CODE 250: Performed by: EMERGENCY MEDICINE

## 2022-01-01 PROCEDURE — 99900035 HC TECH TIME PER 15 MIN (STAT)

## 2022-01-01 PROCEDURE — 96372 THER/PROPH/DIAG INJ SC/IM: CPT | Performed by: EMERGENCY MEDICINE

## 2022-01-01 PROCEDURE — 63600175 PHARM REV CODE 636 W HCPCS: Performed by: EMERGENCY MEDICINE

## 2022-01-01 PROCEDURE — 63600175 PHARM REV CODE 636 W HCPCS

## 2022-01-01 PROCEDURE — 96372 THER/PROPH/DIAG INJ SC/IM: CPT | Mod: 59

## 2022-01-01 PROCEDURE — 92950 HEART/LUNG RESUSCITATION CPR: CPT

## 2022-01-01 PROCEDURE — 36680 INSERT NEEDLE BONE CAVITY: CPT | Mod: LT

## 2022-01-01 RX ORDER — EPINEPHRINE 0.1 MG/ML
INJECTION INTRAVENOUS CODE/TRAUMA/SEDATION MEDICATION
Status: COMPLETED | OUTPATIENT
Start: 2022-01-01 | End: 2022-01-01

## 2022-01-01 RX ORDER — NALOXONE HCL 0.4 MG/ML
VIAL (ML) INJECTION CODE/TRAUMA/SEDATION MEDICATION
Status: COMPLETED | OUTPATIENT
Start: 2022-01-01 | End: 2022-01-01

## 2022-01-01 RX ORDER — CALCIUM CHLORIDE INJECTION 100 MG/ML
INJECTION, SOLUTION INTRAVENOUS CODE/TRAUMA/SEDATION MEDICATION
Status: COMPLETED | OUTPATIENT
Start: 2022-01-01 | End: 2022-01-01

## 2022-01-01 RX ORDER — DIPHENHYDRAMINE HYDROCHLORIDE 50 MG/ML
25 INJECTION INTRAMUSCULAR; INTRAVENOUS
Status: COMPLETED | OUTPATIENT
Start: 2022-01-01 | End: 2022-01-01

## 2022-01-01 RX ORDER — SODIUM BICARBONATE 1 MEQ/ML
SYRINGE (ML) INTRAVENOUS CODE/TRAUMA/SEDATION MEDICATION
Status: COMPLETED | OUTPATIENT
Start: 2022-01-01 | End: 2022-01-01

## 2022-01-01 RX ORDER — NALOXONE HCL 0.4 MG/ML
2 VIAL (ML) INJECTION ONCE
Status: DISCONTINUED | OUTPATIENT
Start: 2022-01-01 | End: 2022-01-01 | Stop reason: HOSPADM

## 2022-01-01 RX ORDER — FLUMAZENIL 0.1 MG/ML
INJECTION INTRAVENOUS CODE/TRAUMA/SEDATION MEDICATION
Status: COMPLETED | OUTPATIENT
Start: 2022-01-01 | End: 2022-01-01

## 2022-01-01 RX ORDER — MAGNESIUM SULFATE HEPTAHYDRATE 500 MG/ML
INJECTION, SOLUTION INTRAMUSCULAR; INTRAVENOUS CODE/TRAUMA/SEDATION MEDICATION
Status: COMPLETED | OUTPATIENT
Start: 2022-01-01 | End: 2022-01-01

## 2022-01-01 RX ORDER — HALOPERIDOL 5 MG/ML
INJECTION INTRAMUSCULAR
Status: COMPLETED
Start: 2022-01-01 | End: 2022-01-01

## 2022-01-01 RX ORDER — FLUMAZENIL 0.1 MG/ML
INJECTION INTRAVENOUS
Status: DISCONTINUED
Start: 2022-01-01 | End: 2022-01-01 | Stop reason: HOSPADM

## 2022-01-01 RX ORDER — MAGNESIUM SULFATE HEPTAHYDRATE 40 MG/ML
INJECTION, SOLUTION INTRAVENOUS
Status: DISCONTINUED
Start: 2022-01-01 | End: 2022-01-01 | Stop reason: HOSPADM

## 2022-01-01 RX ORDER — LORAZEPAM 2 MG/ML
INJECTION INTRAMUSCULAR
Status: DISCONTINUED
Start: 2022-01-01 | End: 2022-01-01 | Stop reason: HOSPADM

## 2022-01-01 RX ORDER — DIPHENHYDRAMINE HYDROCHLORIDE 50 MG/ML
INJECTION INTRAMUSCULAR; INTRAVENOUS
Status: COMPLETED
Start: 2022-01-01 | End: 2022-01-01

## 2022-01-01 RX ORDER — HALOPERIDOL 5 MG/ML
2.5 INJECTION INTRAMUSCULAR
Status: COMPLETED | OUTPATIENT
Start: 2022-01-01 | End: 2022-01-01

## 2022-01-01 RX ADMIN — EPINEPHRINE 1 MG: 0.1 INJECTION INTRACARDIAC; INTRAVENOUS at 10:03

## 2022-01-01 RX ADMIN — FLUMAZENIL 0.5 MG: 0.1 INJECTION, SOLUTION INTRAVENOUS at 10:03

## 2022-01-01 RX ADMIN — SODIUM BICARBONATE 50 MEQ: 84 INJECTION, SOLUTION INTRAVENOUS at 10:03

## 2022-01-01 RX ADMIN — SODIUM CHLORIDE 1000 ML: 0.9 INJECTION, SOLUTION INTRAVENOUS at 10:03

## 2022-01-01 RX ADMIN — HALOPERIDOL 2.5 MG: 5 INJECTION INTRAMUSCULAR at 10:03

## 2022-01-01 RX ADMIN — CALCIUM CHLORIDE 1 G: 100 INJECTION, SOLUTION INTRAVENOUS; INTRAVENTRICULAR at 10:03

## 2022-01-01 RX ADMIN — DIPHENHYDRAMINE HYDROCHLORIDE 25 MG: 50 INJECTION, SOLUTION INTRAMUSCULAR; INTRAVENOUS at 08:03

## 2022-01-01 RX ADMIN — HALOPERIDOL LACTATE 2.5 MG: 5 INJECTION, SOLUTION INTRAMUSCULAR at 10:03

## 2022-01-01 RX ADMIN — NALOXONE HYDROCHLORIDE 0.4 MG: 0.4 INJECTION, SOLUTION INTRAMUSCULAR; INTRAVENOUS; SUBCUTANEOUS at 10:03

## 2022-01-01 RX ADMIN — DIPHENHYDRAMINE HYDROCHLORIDE 25 MG: 50 INJECTION INTRAMUSCULAR; INTRAVENOUS at 08:03

## 2022-01-01 RX ADMIN — EPINEPHRINE 1 MG: 0.1 INJECTION INTRACARDIAC; INTRAVENOUS at 11:03

## 2022-01-01 RX ADMIN — MAGNESIUM SULFATE HEPTAHYDRATE 2 G: 500 INJECTION, SOLUTION INTRAMUSCULAR; INTRAVENOUS at 10:03

## 2022-01-01 RX ADMIN — DEXTROSE MONOHYDRATE 25 G: 500 INJECTION PARENTERAL at 10:03

## 2022-01-01 RX ADMIN — LORAZEPAM 2 MG: 2 INJECTION INTRAMUSCULAR; INTRAVENOUS at 08:03

## 2022-03-24 NOTE — CARE UPDATE
Called by  ed staff (RN) code blue in ed10, upon arrival CNA bagging and RN doing compressions and MD getting ready to intubate.  I took over bagging.  MD intubated with 7.5 24 @lips I secured ett with a alexandre while cpr and bagging continued.  Cpr continued for several rounds then [facility] was called and pt .

## 2022-03-24 NOTE — ED NOTES
[facility] reports that patient's sister called 911 after patient telephoned her sister and informed her that she took a bunch of pills. Unsure of medications taken. EMS reports no medication found in vicinity of the patient. Pt is not communicable at this time but is alert. Unable to answer questions.
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
## 2022-03-25 NOTE — ED NOTES
Patient's sister, Pearl Guillen and cousin, Kelli Guillen arrived. MD accompanied by this RN conference with family and notified of pt passing. Emotional support and counseling provided. All questions answered by MD. Pt jewelry given to sister. Family advised that they are waiting for additional family members to come. Accompanied family to bedside.

## 2022-03-25 NOTE — ED NOTES
When scanning medications at bedside,  time given was entered as 2020 on 3/23/22, but was intended to be entered at 2224 on 3/243/22. Haldol, Benedryl, and Ativan combination (B52) given as ordered due to pt aggressive and combative behavior. Meds were given emergently.

## 2022-03-25 NOTE — ED NOTES
Family contacted and informed of change in patient's condition. Requested family to come to hospital to speak with Dr. Wu

## 2022-03-25 NOTE — ED NOTES
Arrived to patient bedside. Noted that patient was unresponsive and didn't appear to be breathing. No palpable pulse. CPR initiated. See code blue record.
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
## 2022-03-25 NOTE — ED NOTES
Family and  home information obtained. Family was thankful for care provided to patient as they departed. Emotional support again provided. , Tasha Payne, notified that family has departed. Received instructions to send remains to [facility]. Post mortem care will be provided. Security notified.
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
